# Patient Record
Sex: MALE | Race: WHITE | NOT HISPANIC OR LATINO | Employment: OTHER | URBAN - METROPOLITAN AREA
[De-identification: names, ages, dates, MRNs, and addresses within clinical notes are randomized per-mention and may not be internally consistent; named-entity substitution may affect disease eponyms.]

---

## 2017-04-04 ENCOUNTER — AMBULATORY - HEALTHEAST (OUTPATIENT)
Dept: CARDIOLOGY | Facility: CLINIC | Age: 67
End: 2017-04-04

## 2017-04-05 ENCOUNTER — OFFICE VISIT - HEALTHEAST (OUTPATIENT)
Dept: CARDIOLOGY | Facility: CLINIC | Age: 67
End: 2017-04-05

## 2017-04-05 DIAGNOSIS — I10 ESSENTIAL HYPERTENSION: ICD-10-CM

## 2017-04-05 DIAGNOSIS — G47.30 SLEEP APNEA, UNSPECIFIED TYPE: ICD-10-CM

## 2017-04-05 DIAGNOSIS — I48.0 PAROXYSMAL ATRIAL FIBRILLATION (H): ICD-10-CM

## 2017-04-05 ASSESSMENT — MIFFLIN-ST. JEOR: SCORE: 1718.34

## 2017-10-10 ENCOUNTER — RECORDS - HEALTHEAST (OUTPATIENT)
Dept: LAB | Facility: CLINIC | Age: 67
End: 2017-10-10

## 2017-10-10 LAB
CHOLEST SERPL-MCNC: 176 MG/DL
FASTING STATUS PATIENT QL REPORTED: ABNORMAL
HDLC SERPL-MCNC: 30 MG/DL
LDLC SERPL CALC-MCNC: 112 MG/DL
TRIGL SERPL-MCNC: 168 MG/DL

## 2018-03-05 ENCOUNTER — COMMUNICATION - HEALTHEAST (OUTPATIENT)
Dept: CARDIOLOGY | Facility: CLINIC | Age: 68
End: 2018-03-05

## 2018-03-09 ENCOUNTER — OFFICE VISIT - HEALTHEAST (OUTPATIENT)
Dept: CARDIOLOGY | Facility: CLINIC | Age: 68
End: 2018-03-09

## 2018-03-09 DIAGNOSIS — I10 ESSENTIAL HYPERTENSION: ICD-10-CM

## 2018-03-09 DIAGNOSIS — I48.0 PAROXYSMAL ATRIAL FIBRILLATION WITH RAPID VENTRICULAR RESPONSE (H): ICD-10-CM

## 2018-03-09 DIAGNOSIS — G47.33 OBSTRUCTIVE SLEEP APNEA SYNDROME: ICD-10-CM

## 2018-03-09 ASSESSMENT — MIFFLIN-ST. JEOR: SCORE: 1718.34

## 2018-04-09 ENCOUNTER — COMMUNICATION - HEALTHEAST (OUTPATIENT)
Dept: CARDIOLOGY | Facility: CLINIC | Age: 68
End: 2018-04-09

## 2018-04-09 DIAGNOSIS — I10 ESSENTIAL HYPERTENSION: ICD-10-CM

## 2018-04-18 ENCOUNTER — AMBULATORY - HEALTHEAST (OUTPATIENT)
Dept: CARDIOLOGY | Facility: CLINIC | Age: 68
End: 2018-04-18

## 2018-04-18 ENCOUNTER — RECORDS - HEALTHEAST (OUTPATIENT)
Dept: ADMINISTRATIVE | Facility: OTHER | Age: 68
End: 2018-04-18

## 2018-04-20 ENCOUNTER — OFFICE VISIT - HEALTHEAST (OUTPATIENT)
Dept: CARDIOLOGY | Facility: CLINIC | Age: 68
End: 2018-04-20

## 2018-04-20 DIAGNOSIS — I10 ESSENTIAL HYPERTENSION: ICD-10-CM

## 2018-04-20 DIAGNOSIS — G47.33 OBSTRUCTIVE SLEEP APNEA SYNDROME: ICD-10-CM

## 2018-04-20 DIAGNOSIS — I48.0 PAROXYSMAL ATRIAL FIBRILLATION WITH RAPID VENTRICULAR RESPONSE (H): ICD-10-CM

## 2018-04-20 ASSESSMENT — MIFFLIN-ST. JEOR: SCORE: 1722.88

## 2018-09-20 ENCOUNTER — AMBULATORY - HEALTHEAST (OUTPATIENT)
Dept: CARDIOLOGY | Facility: CLINIC | Age: 68
End: 2018-09-20

## 2018-09-20 ENCOUNTER — OFFICE VISIT - HEALTHEAST (OUTPATIENT)
Dept: CARDIOLOGY | Facility: CLINIC | Age: 68
End: 2018-09-20

## 2018-09-20 DIAGNOSIS — G47.33 OBSTRUCTIVE SLEEP APNEA SYNDROME: ICD-10-CM

## 2018-09-20 DIAGNOSIS — I10 ESSENTIAL HYPERTENSION: ICD-10-CM

## 2018-09-20 DIAGNOSIS — I48.0 PAROXYSMAL ATRIAL FIBRILLATION WITH RAPID VENTRICULAR RESPONSE (H): ICD-10-CM

## 2018-09-20 ASSESSMENT — MIFFLIN-ST. JEOR: SCORE: 1722.88

## 2018-09-21 LAB
ATRIAL RATE - MUSE: 49 BPM
DIASTOLIC BLOOD PRESSURE - MUSE: NORMAL MMHG
INTERPRETATION ECG - MUSE: NORMAL
P AXIS - MUSE: 43 DEGREES
PR INTERVAL - MUSE: 162 MS
QRS DURATION - MUSE: 92 MS
QT - MUSE: 456 MS
QTC - MUSE: 411 MS
R AXIS - MUSE: 0 DEGREES
SYSTOLIC BLOOD PRESSURE - MUSE: NORMAL MMHG
T AXIS - MUSE: 28 DEGREES
VENTRICULAR RATE- MUSE: 49 BPM

## 2018-10-25 ENCOUNTER — RECORDS - HEALTHEAST (OUTPATIENT)
Dept: ADMINISTRATIVE | Facility: OTHER | Age: 68
End: 2018-10-25

## 2018-10-31 ENCOUNTER — OFFICE VISIT - HEALTHEAST (OUTPATIENT)
Dept: CARDIOLOGY | Facility: CLINIC | Age: 68
End: 2018-10-31

## 2018-10-31 DIAGNOSIS — I10 ESSENTIAL HYPERTENSION: ICD-10-CM

## 2018-10-31 DIAGNOSIS — I48.0 PAROXYSMAL ATRIAL FIBRILLATION WITH RAPID VENTRICULAR RESPONSE (H): ICD-10-CM

## 2018-10-31 DIAGNOSIS — G47.33 OBSTRUCTIVE SLEEP APNEA SYNDROME: ICD-10-CM

## 2018-10-31 ASSESSMENT — MIFFLIN-ST. JEOR: SCORE: 1727.41

## 2018-11-07 ENCOUNTER — COMMUNICATION - HEALTHEAST (OUTPATIENT)
Dept: CARDIOLOGY | Facility: CLINIC | Age: 68
End: 2018-11-07

## 2018-11-07 ENCOUNTER — AMBULATORY - HEALTHEAST (OUTPATIENT)
Dept: CARDIOLOGY | Facility: CLINIC | Age: 68
End: 2018-11-07

## 2018-11-07 ENCOUNTER — SURGERY - HEALTHEAST (OUTPATIENT)
Dept: CARDIOLOGY | Facility: CLINIC | Age: 68
End: 2018-11-07

## 2018-11-07 DIAGNOSIS — I48.0 PAROXYSMAL ATRIAL FIBRILLATION (H): ICD-10-CM

## 2018-12-11 ENCOUNTER — OFFICE VISIT - HEALTHEAST (OUTPATIENT)
Dept: CARDIOLOGY | Facility: CLINIC | Age: 68
End: 2018-12-11

## 2018-12-11 DIAGNOSIS — I10 ESSENTIAL HYPERTENSION: ICD-10-CM

## 2018-12-11 DIAGNOSIS — G47.33 OBSTRUCTIVE SLEEP APNEA SYNDROME: ICD-10-CM

## 2018-12-11 DIAGNOSIS — I48.0 PAROXYSMAL ATRIAL FIBRILLATION WITH RAPID VENTRICULAR RESPONSE (H): ICD-10-CM

## 2018-12-11 ASSESSMENT — MIFFLIN-ST. JEOR: SCORE: 1709.27

## 2018-12-12 ENCOUNTER — COMMUNICATION - HEALTHEAST (OUTPATIENT)
Dept: CARDIOLOGY | Facility: CLINIC | Age: 68
End: 2018-12-12

## 2019-01-02 ENCOUNTER — COMMUNICATION - HEALTHEAST (OUTPATIENT)
Dept: CARDIOLOGY | Facility: CLINIC | Age: 69
End: 2019-01-02

## 2019-01-02 DIAGNOSIS — I48.0 PAROXYSMAL ATRIAL FIBRILLATION (H): ICD-10-CM

## 2019-01-04 ENCOUNTER — COMMUNICATION - HEALTHEAST (OUTPATIENT)
Dept: CARDIOLOGY | Facility: CLINIC | Age: 69
End: 2019-01-04

## 2019-02-14 ENCOUNTER — RECORDS - HEALTHEAST (OUTPATIENT)
Dept: LAB | Facility: CLINIC | Age: 69
End: 2019-02-14

## 2019-02-15 LAB
ALBUMIN SERPL-MCNC: 4.1 G/DL (ref 3.5–5)
ALP SERPL-CCNC: 79 U/L (ref 45–120)
ALT SERPL W P-5'-P-CCNC: 21 U/L (ref 0–45)
ANION GAP SERPL CALCULATED.3IONS-SCNC: 8 MMOL/L (ref 5–18)
AST SERPL W P-5'-P-CCNC: 20 U/L (ref 0–40)
BILIRUB SERPL-MCNC: 0.8 MG/DL (ref 0–1)
BUN SERPL-MCNC: 15 MG/DL (ref 8–22)
CALCIUM SERPL-MCNC: 10 MG/DL (ref 8.5–10.5)
CHLORIDE BLD-SCNC: 107 MMOL/L (ref 98–107)
CHOLEST SERPL-MCNC: 184 MG/DL
CO2 SERPL-SCNC: 29 MMOL/L (ref 22–31)
CREAT SERPL-MCNC: 1.12 MG/DL (ref 0.7–1.3)
FASTING STATUS PATIENT QL REPORTED: ABNORMAL
GFR SERPL CREATININE-BSD FRML MDRD: >60 ML/MIN/1.73M2
GLUCOSE BLD-MCNC: 91 MG/DL (ref 70–125)
HDLC SERPL-MCNC: 34 MG/DL
LDLC SERPL CALC-MCNC: 130 MG/DL
POTASSIUM BLD-SCNC: 4.5 MMOL/L (ref 3.5–5)
PROT SERPL-MCNC: 7.2 G/DL (ref 6–8)
SODIUM SERPL-SCNC: 144 MMOL/L (ref 136–145)
TRIGL SERPL-MCNC: 101 MG/DL
TSH SERPL DL<=0.005 MIU/L-ACNC: 1.47 UIU/ML (ref 0.3–5)

## 2019-02-18 LAB — 25(OH)D3 SERPL-MCNC: 37.1 NG/ML (ref 30–80)

## 2019-02-25 ENCOUNTER — HOSPITAL ENCOUNTER (OUTPATIENT)
Dept: CARDIOLOGY | Facility: CLINIC | Age: 69
Discharge: HOME | End: 2019-02-25
Attending: INTERNAL MEDICINE

## 2019-02-25 ENCOUNTER — COMMUNICATION - HEALTHEAST (OUTPATIENT)
Dept: CARDIOLOGY | Facility: CLINIC | Age: 69
End: 2019-02-25

## 2019-02-25 DIAGNOSIS — I48.0 PAROXYSMAL ATRIAL FIBRILLATION (H): ICD-10-CM

## 2019-02-25 LAB
AORTIC ROOT: 2.7 CM
AORTIC VALVE MEAN VELOCITY: 94.9 CM/S
AV DIMENSIONLESS INDEX VTI: 0.9
AV MEAN GRADIENT: 4 MMHG
AV PEAK GRADIENT: 6.9 MMHG
AV VALVE AREA: 3.6 CM2
AV VELOCITY RATIO: 0.9
BSA FOR ECHO PROCEDURE: 2.17 M2
CV BLOOD PRESSURE: ABNORMAL MMHG
CV ECHO HEIGHT: 68 IN
CV ECHO WEIGHT: 216 LBS
DOP CALC AO PEAK VEL: 131 CM/S
DOP CALC AO VTI: 28.1 CM
DOP CALC LVOT AREA: 4.15 CM2
DOP CALC LVOT DIAMETER: 2.3 CM
DOP CALC LVOT PEAK VEL: 124 CM/S
DOP CALC LVOT STROKE VOLUME: 100.1 CM3
DOP CALCLVOT PEAK VEL VTI: 24.1 CM
EJECTION FRACTION: 80 % (ref 55–75)
FRACTIONAL SHORTENING: 41.5 % (ref 28–44)
INTERVENTRICULAR SEPTUM IN END DIASTOLE: 1 CM (ref 0.6–1)
IVS/PW RATIO: 0.9
LA AREA 1: 24.7 CM2
LA AREA 2: 25.1 CM2
LEFT ATRIUM AREA: 25.1 CM2
LEFT ATRIUM LENGTH: 5.2 CM
LEFT ATRIUM SIZE: 3.5 CM
LEFT ATRIUM VOLUME INDEX: 46.7 ML/M2
LEFT ATRIUM VOLUME: 101.3 ML
LEFT VENTRICLE CARDIAC INDEX: 2.6 L/MIN/M2
LEFT VENTRICLE CARDIAC OUTPUT: 5.6 L/MIN
LEFT VENTRICLE DIASTOLIC VOLUME INDEX: 50.2 CM3/M2 (ref 34–74)
LEFT VENTRICLE DIASTOLIC VOLUME: 109 CM3 (ref 62–150)
LEFT VENTRICLE HEART RATE: 56 BPM
LEFT VENTRICLE MASS INDEX: 98.6 G/M2
LEFT VENTRICLE SYSTOLIC VOLUME INDEX: 10.1 CM3/M2 (ref 11–31)
LEFT VENTRICLE SYSTOLIC VOLUME: 22 CM3 (ref 21–61)
LEFT VENTRICULAR INTERNAL DIMENSION IN DIASTOLE: 5.3 CM (ref 4.2–5.8)
LEFT VENTRICULAR INTERNAL DIMENSION IN SYSTOLE: 3.1 CM (ref 2.5–4)
LEFT VENTRICULAR MASS: 213.9 G
LEFT VENTRICULAR OUTFLOW TRACT MEAN GRADIENT: 3 MMHG
LEFT VENTRICULAR OUTFLOW TRACT MEAN VELOCITY: 79.7 CM/S
LEFT VENTRICULAR OUTFLOW TRACT PEAK GRADIENT: 6 MMHG
LEFT VENTRICULAR POSTERIOR WALL IN END DIASTOLE: 1.1 CM (ref 0.6–1)
LV STROKE VOLUME INDEX: 46.1 ML/M2
MITRAL VALVE E/A RATIO: 1.1
MV AVERAGE E/E' RATIO: 11.9 CM/S
MV DECELERATION TIME: 215 MSEC
MV E'TISSUE VEL-LAT: 7.31 CM/S
MV E'TISSUE VEL-MED: 8.09 CM/S
MV LATERAL E/E' RATIO: 12.5
MV MEDIAL E/E' RATIO: 11.3
MV PEAK A VELOCITY: 81.9 CM/S
MV PEAK E VELOCITY: 91.4 CM/S
NUC REST DIASTOLIC VOLUME INDEX: 3456 LBS
NUC REST SYSTOLIC VOLUME INDEX: 68 IN
PR MAX PG: 3 MMHG
PR PEAK VELOCITY: 90.9 CM/S
TRICUSPID REGURGITATION PEAK PRESSURE GRADIENT: 28.1 MMHG
TRICUSPID VALVE ANULAR PLANE SYSTOLIC EXCURSION: 2.9 CM
TRICUSPID VALVE PEAK REGURGITANT VELOCITY: 265 CM/S

## 2019-02-25 ASSESSMENT — MIFFLIN-ST. JEOR: SCORE: 1709.27

## 2019-03-04 ENCOUNTER — HOSPITAL ENCOUNTER (OUTPATIENT)
Dept: CT IMAGING | Facility: CLINIC | Age: 69
Discharge: HOME | End: 2019-03-04
Attending: INTERNAL MEDICINE

## 2019-03-04 DIAGNOSIS — I48.0 PAROXYSMAL ATRIAL FIBRILLATION (H): ICD-10-CM

## 2019-03-04 LAB — BSA FOR ECHO PROCEDURE: 2.17 M2

## 2019-03-04 ASSESSMENT — MIFFLIN-ST. JEOR: SCORE: 1709.27

## 2019-03-06 ENCOUNTER — AMBULATORY - HEALTHEAST (OUTPATIENT)
Dept: CARDIOLOGY | Facility: CLINIC | Age: 69
End: 2019-03-06

## 2019-03-06 ENCOUNTER — RECORDS - HEALTHEAST (OUTPATIENT)
Dept: ADMINISTRATIVE | Facility: OTHER | Age: 69
End: 2019-03-06

## 2019-03-13 ENCOUNTER — SURGERY - HEALTHEAST (OUTPATIENT)
Dept: CARDIOLOGY | Facility: CLINIC | Age: 69
End: 2019-03-13

## 2019-03-13 ENCOUNTER — AMBULATORY - HEALTHEAST (OUTPATIENT)
Dept: CARDIOLOGY | Facility: CLINIC | Age: 69
End: 2019-03-13

## 2019-03-13 ENCOUNTER — OFFICE VISIT - HEALTHEAST (OUTPATIENT)
Dept: CARDIOLOGY | Facility: CLINIC | Age: 69
End: 2019-03-13

## 2019-03-13 DIAGNOSIS — I48.0 PAROXYSMAL ATRIAL FIBRILLATION (H): ICD-10-CM

## 2019-03-13 DIAGNOSIS — G47.33 OBSTRUCTIVE SLEEP APNEA SYNDROME: ICD-10-CM

## 2019-03-13 LAB
ANION GAP SERPL CALCULATED.3IONS-SCNC: 8 MMOL/L (ref 5–18)
BUN SERPL-MCNC: 15 MG/DL (ref 8–22)
CALCIUM SERPL-MCNC: 9.6 MG/DL (ref 8.5–10.5)
CHLORIDE BLD-SCNC: 107 MMOL/L (ref 98–107)
CO2 SERPL-SCNC: 27 MMOL/L (ref 22–31)
CREAT SERPL-MCNC: 1.17 MG/DL (ref 0.7–1.3)
ERYTHROCYTE [DISTWIDTH] IN BLOOD BY AUTOMATED COUNT: 13.6 % (ref 11–14.5)
GFR SERPL CREATININE-BSD FRML MDRD: >60 ML/MIN/1.73M2
GLUCOSE BLD-MCNC: 132 MG/DL (ref 70–125)
HCT VFR BLD AUTO: 42.5 % (ref 40–54)
HGB BLD-MCNC: 14.4 G/DL (ref 14–18)
MCH RBC QN AUTO: 30.2 PG (ref 27–34)
MCHC RBC AUTO-ENTMCNC: 33.9 G/DL (ref 32–36)
MCV RBC AUTO: 89 FL (ref 80–100)
PLATELET # BLD AUTO: 243 THOU/UL (ref 140–440)
PMV BLD AUTO: 11 FL (ref 8.5–12.5)
POTASSIUM BLD-SCNC: 3.8 MMOL/L (ref 3.5–5)
RBC # BLD AUTO: 4.77 MILL/UL (ref 4.4–6.2)
SODIUM SERPL-SCNC: 142 MMOL/L (ref 136–145)
WBC: 7.7 THOU/UL (ref 4–11)

## 2019-03-13 ASSESSMENT — MIFFLIN-ST. JEOR: SCORE: 1709.27

## 2019-03-14 ENCOUNTER — COMMUNICATION - HEALTHEAST (OUTPATIENT)
Dept: CARDIOLOGY | Facility: CLINIC | Age: 69
End: 2019-03-14

## 2019-03-14 LAB
ATRIAL RATE - MUSE: 59 BPM
DIASTOLIC BLOOD PRESSURE - MUSE: NORMAL MMHG
INTERPRETATION ECG - MUSE: NORMAL
P AXIS - MUSE: 63 DEGREES
PR INTERVAL - MUSE: 162 MS
QRS DURATION - MUSE: 94 MS
QT - MUSE: 414 MS
QTC - MUSE: 409 MS
R AXIS - MUSE: -2 DEGREES
SYSTOLIC BLOOD PRESSURE - MUSE: NORMAL MMHG
T AXIS - MUSE: 35 DEGREES
VENTRICULAR RATE- MUSE: 59 BPM

## 2019-03-15 ENCOUNTER — ANESTHESIA - HEALTHEAST (OUTPATIENT)
Dept: CARDIOLOGY | Facility: CLINIC | Age: 69
End: 2019-03-15

## 2019-03-18 ENCOUNTER — RECORDS - HEALTHEAST (OUTPATIENT)
Dept: ADMINISTRATIVE | Facility: OTHER | Age: 69
End: 2019-03-18

## 2019-03-19 ENCOUNTER — SURGERY - HEALTHEAST (OUTPATIENT)
Dept: CARDIOLOGY | Facility: CLINIC | Age: 69
End: 2019-03-19

## 2019-03-19 ENCOUNTER — COMMUNICATION - HEALTHEAST (OUTPATIENT)
Dept: CARDIOLOGY | Facility: CLINIC | Age: 69
End: 2019-03-19

## 2019-03-19 DIAGNOSIS — I48.19 PERSISTENT ATRIAL FIBRILLATION (H): ICD-10-CM

## 2019-03-20 ENCOUNTER — COMMUNICATION - HEALTHEAST (OUTPATIENT)
Dept: CARDIOLOGY | Facility: CLINIC | Age: 69
End: 2019-03-20

## 2019-03-26 ENCOUNTER — ANESTHESIA - HEALTHEAST (OUTPATIENT)
Dept: CARDIOLOGY | Facility: CLINIC | Age: 69
End: 2019-03-26

## 2019-03-26 ASSESSMENT — MIFFLIN-ST. JEOR: SCORE: 1682.05

## 2019-03-27 ENCOUNTER — SURGERY - HEALTHEAST (OUTPATIENT)
Dept: CARDIOLOGY | Facility: CLINIC | Age: 69
End: 2019-03-27

## 2019-03-27 ASSESSMENT — MIFFLIN-ST. JEOR
SCORE: 1696.57
SCORE: 1682.05

## 2019-03-28 ASSESSMENT — MIFFLIN-ST. JEOR: SCORE: 1696.51

## 2019-04-01 ENCOUNTER — AMBULATORY - HEALTHEAST (OUTPATIENT)
Dept: CARDIOLOGY | Facility: CLINIC | Age: 69
End: 2019-04-01

## 2019-04-01 DIAGNOSIS — I48.0 PAROXYSMAL ATRIAL FIBRILLATION (H): ICD-10-CM

## 2019-04-01 ASSESSMENT — MIFFLIN-ST. JEOR: SCORE: 1705.19

## 2019-04-03 ENCOUNTER — DOCUMENTATION ONLY (OUTPATIENT)
Dept: OTHER | Facility: CLINIC | Age: 69
End: 2019-04-03

## 2019-05-03 ENCOUNTER — RECORDS - HEALTHEAST (OUTPATIENT)
Dept: ADMINISTRATIVE | Facility: OTHER | Age: 69
End: 2019-05-03

## 2019-05-03 ENCOUNTER — AMBULATORY - HEALTHEAST (OUTPATIENT)
Dept: CARDIOLOGY | Facility: CLINIC | Age: 69
End: 2019-05-03

## 2019-05-08 ENCOUNTER — OFFICE VISIT - HEALTHEAST (OUTPATIENT)
Dept: CARDIOLOGY | Facility: CLINIC | Age: 69
End: 2019-05-08

## 2019-05-08 DIAGNOSIS — G47.33 OBSTRUCTIVE SLEEP APNEA SYNDROME: ICD-10-CM

## 2019-05-08 DIAGNOSIS — I48.0 PAROXYSMAL ATRIAL FIBRILLATION (H): ICD-10-CM

## 2019-05-08 DIAGNOSIS — I10 ESSENTIAL HYPERTENSION: ICD-10-CM

## 2019-05-08 DIAGNOSIS — Z98.890 STATUS POST CATHETER ABLATION OF ATRIAL FIBRILLATION: ICD-10-CM

## 2019-05-08 ASSESSMENT — MIFFLIN-ST. JEOR: SCORE: 1713.81

## 2019-05-15 ENCOUNTER — AMBULATORY - HEALTHEAST (OUTPATIENT)
Dept: CARDIOLOGY | Facility: CLINIC | Age: 69
End: 2019-05-15

## 2019-05-15 ENCOUNTER — HOSPITAL ENCOUNTER (OUTPATIENT)
Dept: CARDIOLOGY | Facility: CLINIC | Age: 69
Discharge: HOME | End: 2019-05-15
Attending: NURSE PRACTITIONER

## 2019-05-15 DIAGNOSIS — Z98.890 STATUS POST CATHETER ABLATION OF ATRIAL FIBRILLATION: ICD-10-CM

## 2019-05-15 DIAGNOSIS — I48.0 PAROXYSMAL ATRIAL FIBRILLATION (H): ICD-10-CM

## 2019-05-15 DIAGNOSIS — I10 ESSENTIAL HYPERTENSION: ICD-10-CM

## 2019-05-15 LAB
ANION GAP SERPL CALCULATED.3IONS-SCNC: 7 MMOL/L (ref 5–18)
BUN SERPL-MCNC: 16 MG/DL (ref 8–22)
CALCIUM SERPL-MCNC: 10.4 MG/DL (ref 8.5–10.5)
CHLORIDE BLD-SCNC: 107 MMOL/L (ref 98–107)
CO2 SERPL-SCNC: 28 MMOL/L (ref 22–31)
CREAT SERPL-MCNC: 1.22 MG/DL (ref 0.7–1.3)
GFR SERPL CREATININE-BSD FRML MDRD: 59 ML/MIN/1.73M2
GLUCOSE BLD-MCNC: 116 MG/DL (ref 70–125)
POTASSIUM BLD-SCNC: 4.2 MMOL/L (ref 3.5–5)
SODIUM SERPL-SCNC: 142 MMOL/L (ref 136–145)

## 2019-07-01 ENCOUNTER — OFFICE VISIT - HEALTHEAST (OUTPATIENT)
Dept: CARDIOLOGY | Facility: CLINIC | Age: 69
End: 2019-07-01

## 2019-07-01 DIAGNOSIS — I10 ESSENTIAL HYPERTENSION: ICD-10-CM

## 2019-07-01 DIAGNOSIS — G47.33 OBSTRUCTIVE SLEEP APNEA SYNDROME: ICD-10-CM

## 2019-07-01 DIAGNOSIS — I48.0 PAROXYSMAL ATRIAL FIBRILLATION (H): ICD-10-CM

## 2019-07-01 DIAGNOSIS — Z98.890 STATUS POST CATHETER ABLATION OF ATRIAL FIBRILLATION: ICD-10-CM

## 2019-07-01 ASSESSMENT — MIFFLIN-ST. JEOR: SCORE: 1706.55

## 2019-12-07 ENCOUNTER — COMMUNICATION - HEALTHEAST (OUTPATIENT)
Dept: CARDIOLOGY | Facility: CLINIC | Age: 69
End: 2019-12-07

## 2019-12-07 DIAGNOSIS — I48.0 PAROXYSMAL ATRIAL FIBRILLATION WITH RAPID VENTRICULAR RESPONSE (H): ICD-10-CM

## 2020-04-14 ENCOUNTER — RECORDS - HEALTHEAST (OUTPATIENT)
Dept: LAB | Facility: CLINIC | Age: 70
End: 2020-04-14

## 2020-04-14 LAB
ANION GAP SERPL CALCULATED.3IONS-SCNC: 12 MMOL/L (ref 5–18)
BUN SERPL-MCNC: 13 MG/DL (ref 8–28)
CALCIUM SERPL-MCNC: 10.1 MG/DL (ref 8.5–10.5)
CHLORIDE BLD-SCNC: 104 MMOL/L (ref 98–107)
CHOLEST SERPL-MCNC: 226 MG/DL
CO2 SERPL-SCNC: 27 MMOL/L (ref 22–31)
CREAT SERPL-MCNC: 1.09 MG/DL (ref 0.7–1.3)
FASTING STATUS PATIENT QL REPORTED: ABNORMAL
GFR SERPL CREATININE-BSD FRML MDRD: >60 ML/MIN/1.73M2
GLUCOSE BLD-MCNC: 87 MG/DL (ref 70–125)
HDLC SERPL-MCNC: 35 MG/DL
LDLC SERPL CALC-MCNC: 164 MG/DL
POTASSIUM BLD-SCNC: 4 MMOL/L (ref 3.5–5)
SODIUM SERPL-SCNC: 143 MMOL/L (ref 136–145)
TRIGL SERPL-MCNC: 133 MG/DL

## 2020-04-20 ENCOUNTER — COMMUNICATION - HEALTHEAST (OUTPATIENT)
Dept: CARDIOLOGY | Facility: CLINIC | Age: 70
End: 2020-04-20

## 2020-04-20 DIAGNOSIS — I10 ESSENTIAL HYPERTENSION: ICD-10-CM

## 2020-06-06 ENCOUNTER — COMMUNICATION - HEALTHEAST (OUTPATIENT)
Dept: CARDIOLOGY | Facility: CLINIC | Age: 70
End: 2020-06-06

## 2020-06-06 DIAGNOSIS — I48.0 PAROXYSMAL ATRIAL FIBRILLATION WITH RAPID VENTRICULAR RESPONSE (H): ICD-10-CM

## 2020-06-11 ENCOUNTER — RECORDS - HEALTHEAST (OUTPATIENT)
Dept: ADMINISTRATIVE | Facility: OTHER | Age: 70
End: 2020-06-11

## 2020-06-11 ENCOUNTER — AMBULATORY - HEALTHEAST (OUTPATIENT)
Dept: CARDIOLOGY | Facility: CLINIC | Age: 70
End: 2020-06-11

## 2020-06-16 ENCOUNTER — OFFICE VISIT - HEALTHEAST (OUTPATIENT)
Dept: CARDIOLOGY | Facility: CLINIC | Age: 70
End: 2020-06-16

## 2020-06-16 DIAGNOSIS — Z98.890 STATUS POST CATHETER ABLATION OF ATRIAL FIBRILLATION: ICD-10-CM

## 2020-06-16 DIAGNOSIS — G47.33 OBSTRUCTIVE SLEEP APNEA SYNDROME: ICD-10-CM

## 2020-06-16 DIAGNOSIS — E78.5 HYPERLIPIDEMIA LDL GOAL <100: ICD-10-CM

## 2020-06-16 DIAGNOSIS — I48.0 PAROXYSMAL ATRIAL FIBRILLATION (H): ICD-10-CM

## 2020-06-16 DIAGNOSIS — I10 ESSENTIAL HYPERTENSION: ICD-10-CM

## 2020-06-16 RX ORDER — ATORVASTATIN CALCIUM 40 MG/1
80 TABLET, FILM COATED ORAL DAILY
Status: SHIPPED | COMMUNITY
Start: 2020-05-14 | End: 2023-05-09

## 2020-06-16 RX ORDER — AMLODIPINE BESYLATE 10 MG/1
10 TABLET ORAL DAILY
Status: SHIPPED | COMMUNITY
Start: 2020-05-14

## 2020-06-19 ENCOUNTER — HOSPITAL ENCOUNTER (OUTPATIENT)
Dept: CARDIOLOGY | Facility: HOSPITAL | Age: 70
Discharge: HOME | End: 2020-06-19
Attending: INTERNAL MEDICINE

## 2020-06-19 DIAGNOSIS — I48.0 PAROXYSMAL ATRIAL FIBRILLATION (H): ICD-10-CM

## 2020-06-25 ENCOUNTER — RECORDS - HEALTHEAST (OUTPATIENT)
Dept: LAB | Facility: CLINIC | Age: 70
End: 2020-06-25

## 2020-06-25 LAB
CHOLEST SERPL-MCNC: 156 MG/DL
FASTING STATUS PATIENT QL REPORTED: YES
HDLC SERPL-MCNC: 28 MG/DL
LDLC SERPL CALC-MCNC: 106 MG/DL
TRIGL SERPL-MCNC: 109 MG/DL

## 2020-07-29 ENCOUNTER — COMMUNICATION - HEALTHEAST (OUTPATIENT)
Dept: CARDIOLOGY | Facility: CLINIC | Age: 70
End: 2020-07-29

## 2020-08-10 ENCOUNTER — RECORDS - HEALTHEAST (OUTPATIENT)
Dept: LAB | Facility: CLINIC | Age: 70
End: 2020-08-10

## 2020-08-10 LAB
CHOLEST SERPL-MCNC: 132 MG/DL
FASTING STATUS PATIENT QL REPORTED: NO
HDLC SERPL-MCNC: 23 MG/DL
LDLC SERPL CALC-MCNC: 47 MG/DL
TRIGL SERPL-MCNC: 310 MG/DL

## 2020-11-30 ENCOUNTER — COMMUNICATION - HEALTHEAST (OUTPATIENT)
Dept: CARDIOLOGY | Facility: CLINIC | Age: 70
End: 2020-11-30

## 2020-11-30 DIAGNOSIS — I48.0 PAROXYSMAL ATRIAL FIBRILLATION WITH RAPID VENTRICULAR RESPONSE (H): ICD-10-CM

## 2020-12-07 ENCOUNTER — RECORDS - HEALTHEAST (OUTPATIENT)
Dept: LAB | Facility: CLINIC | Age: 70
End: 2020-12-07

## 2020-12-07 LAB
ANION GAP SERPL CALCULATED.3IONS-SCNC: 8 MMOL/L (ref 5–18)
BUN SERPL-MCNC: 17 MG/DL (ref 8–28)
CALCIUM SERPL-MCNC: 9.9 MG/DL (ref 8.5–10.5)
CHLORIDE BLD-SCNC: 106 MMOL/L (ref 98–107)
CO2 SERPL-SCNC: 28 MMOL/L (ref 22–31)
CREAT SERPL-MCNC: 1.24 MG/DL (ref 0.7–1.3)
ERYTHROCYTE [DISTWIDTH] IN BLOOD BY AUTOMATED COUNT: 13.4 % (ref 11–14.5)
GFR SERPL CREATININE-BSD FRML MDRD: 57 ML/MIN/1.73M2
GLUCOSE BLD-MCNC: 113 MG/DL (ref 70–125)
HCT VFR BLD AUTO: 43.8 % (ref 40–54)
HGB BLD-MCNC: 15.4 G/DL (ref 14–18)
MCH RBC QN AUTO: 31.6 PG (ref 27–34)
MCHC RBC AUTO-ENTMCNC: 35.2 G/DL (ref 32–36)
MCV RBC AUTO: 90 FL (ref 80–100)
PLATELET # BLD AUTO: 253 THOU/UL (ref 140–440)
PMV BLD AUTO: 11.5 FL (ref 8.5–12.5)
POTASSIUM BLD-SCNC: 4.2 MMOL/L (ref 3.5–5)
RBC # BLD AUTO: 4.88 MILL/UL (ref 4.4–6.2)
SODIUM SERPL-SCNC: 142 MMOL/L (ref 136–145)
WBC: 8.4 THOU/UL (ref 4–11)

## 2021-01-11 ENCOUNTER — COMMUNICATION - HEALTHEAST (OUTPATIENT)
Dept: CARDIOLOGY | Facility: CLINIC | Age: 71
End: 2021-01-11

## 2021-01-15 ENCOUNTER — RECORDS - HEALTHEAST (OUTPATIENT)
Dept: LAB | Facility: CLINIC | Age: 71
End: 2021-01-15

## 2021-01-15 ENCOUNTER — COMMUNICATION - HEALTHEAST (OUTPATIENT)
Dept: CARDIOLOGY | Facility: CLINIC | Age: 71
End: 2021-01-15

## 2021-01-15 ENCOUNTER — AMBULATORY - HEALTHEAST (OUTPATIENT)
Dept: CARDIOLOGY | Facility: CLINIC | Age: 71
End: 2021-01-15

## 2021-01-15 ENCOUNTER — TRANSFERRED RECORDS (OUTPATIENT)
Dept: HEALTH INFORMATION MANAGEMENT | Facility: CLINIC | Age: 71
End: 2021-01-15

## 2021-01-15 ENCOUNTER — RECORDS - HEALTHEAST (OUTPATIENT)
Dept: ADMINISTRATIVE | Facility: OTHER | Age: 71
End: 2021-01-15

## 2021-01-15 LAB
CHOLEST SERPL-MCNC: 153 MG/DL
FASTING STATUS PATIENT QL REPORTED: YES
HDLC SERPL-MCNC: 31 MG/DL
LDLC SERPL CALC-MCNC: 105 MG/DL
PSA SERPL-MCNC: <0.1 NG/ML (ref 0–6.5)
TRIGL SERPL-MCNC: 87 MG/DL

## 2021-01-18 ENCOUNTER — OFFICE VISIT - HEALTHEAST (OUTPATIENT)
Dept: CARDIOLOGY | Facility: CLINIC | Age: 71
End: 2021-01-18

## 2021-01-18 DIAGNOSIS — I10 ESSENTIAL HYPERTENSION: ICD-10-CM

## 2021-01-18 DIAGNOSIS — I48.0 PAROXYSMAL ATRIAL FIBRILLATION (H): ICD-10-CM

## 2021-01-18 DIAGNOSIS — Z98.890 STATUS POST CATHETER ABLATION OF ATRIAL FIBRILLATION: ICD-10-CM

## 2021-01-18 DIAGNOSIS — G47.33 OBSTRUCTIVE SLEEP APNEA SYNDROME: ICD-10-CM

## 2021-04-11 ENCOUNTER — COMMUNICATION - HEALTHEAST (OUTPATIENT)
Dept: CARDIOLOGY | Facility: CLINIC | Age: 71
End: 2021-04-11

## 2021-04-11 DIAGNOSIS — I10 ESSENTIAL HYPERTENSION: ICD-10-CM

## 2021-04-12 RX ORDER — LISINOPRIL AND HYDROCHLOROTHIAZIDE 20; 25 MG/1; MG/1
TABLET ORAL
Qty: 90 TABLET | Refills: 2 | Status: SHIPPED | OUTPATIENT
Start: 2021-04-12 | End: 2022-01-04

## 2021-05-27 NOTE — PROGRESS NOTES
Post Procedural PVI Follow Up Visit    Pt is seen in clinic today status post PVI with Dr Lu on 3/27/19.     General Assessment:   Pts vital signs stable, weight compared to pre procedural weight and is stable, lung sounds clear, heart rate regular, heart sounds S1 and S2 present, palpable radial and pedal pulses and no edema/fluid retention noted.   Pt denies generalized or localized pain abnormal to healing s/p, difficulty swallowing, SOB, thoat pain, heart burn, chest pain, urinary retention/difficulty and s/s of infection.  Pt is tolerating advancement in activity well, staying well hydrated, has a good appetite, eating well balanced meals and gradually working into baseline activity.     Rhythm Assessment:   Pt denies palpitations, irregularities in HR or rhythm and symptoms or sustained AF episodes.    Procedure Site Assessment:   Pts right groin has 2 puncture sites, is C/D/I, no drainage, moderate amount of bruising noted around puncture sites, 1 suture in place, suture removed, groin is soft, and pt denies pain at the site. Left groin has 2 puncture sites, is C/D/I, no drainage, moderate amount of amount of bruising noted around puncture site, 1 suture in place, sutures removed, groin is soft, and pt denies pain at the site. No bruits we heard bilaterally, and pt has strong bilateral femoral and pedal pulses present. All puncture sites were left open to air.    Anticoagulation/Medication:  Pt was instructed to remain on Eliquis without interruption until seen for 3mo follow-up, for further instructions/managment.  Reviewed with pt importance of anticoagulation s/p PVI, reviewed with pt s/s of bleeding while on anticoagulation s/p PVI and encouraged to call with any questions or concerns about anticoagulants  Pt will continue ASA 81mg Daily for 1 mo s/p PVI    Education completed with pt at this visit:  Reviewed post-op PVI healing process, follow up office visit requirements, physical restrictions,  nutritional requirements, when to contact EP-RN/EP-MD, contact information was given to the pt for further concerns or questions and pt verbalized understanding    4/1/2019 10:58 AM  Sadaf Myles RN

## 2021-05-27 NOTE — ANESTHESIA CARE TRANSFER NOTE
Last vitals:   Vitals:    03/28/19 0801   BP: 124/65   Pulse: (!) 57   Resp: 18   Temp: 37  C (98.6  F)   SpO2: 98%     Patient's level of consciousness is drowsy  Spontaneous respirations: yes  Maintains airway independently: yes  Dentition unchanged: yes  Oropharynx: oropharynx clear of all foreign objects    QCDR Measures:  ASA# 20 - Surgical Safety Checklist: WHO surgical safety checklist completed prior to induction    PQRS# 430 - Adult PONV Prevention: 4558F - Pt received => 2 anti-emetic agents (different classes) preop & intraop  ASA# 8 - Peds PONV Prevention: NA - Not pediatric patient, not GA or 2 or more risk factors NOT present  PQRS# 424 - Linsey-op Temp Management: 4559F - At least one body temp DOCUMENTED => 35.5C or 95.9F within required timeframe  PQRS# 426 - PACU Transfer Protocol: - Transfer of care checklist used  ASA# 14 - Acute Post-op Pain: ASA14B - Patient did NOT experience pain >= 7 out of 10

## 2021-05-27 NOTE — ANESTHESIA POSTPROCEDURE EVALUATION
Patient: Conrad Carson  EP Ablation PVI - 545AM ADMIT, GEN ANES - WHOLE CASE, NO DEV, TANYA - NOTIFIED 3/19, 4HRS W/ICE, H&P - 3/13, TEACH - EP RNS, EP Ablation Atrial Flutter  Anesthesia type: general    Patient location: Telemetry/Step Down Unit  Last vitals:   Vitals:    03/27/19 1720   BP: 142/78   Pulse:    Resp:    Temp:    SpO2:      Post vital signs: stable  Level of consciousness: awake, alert and oriented  Post-anesthesia pain: pain controlled  Post-anesthesia nausea and vomiting: no  Pulmonary: unassisted, return to baseline  Cardiovascular: stable and blood pressure at baseline  Hydration: adequate  Anesthetic events: no    QCDR Measures:  ASA# 11 - Linsey-op Cardiac Arrest: ASA11B - Patient did NOT experience unanticipated cardiac arrest  ASA# 12 - Linsey-op Mortality Rate: ASA12B - Patient did NOT die  ASA# 13 - PACU Re-Intubation Rate: ASA13B - Patient did NOT require a new airway mgmt  ASA# 10 - Composite Anes Safety: ASA10A - No serious adverse event    Additional Notes:  Pt feels a little unsteady/off balance when ambulating.  Pt has assistance with ambulation.  RN at bedside.  VSS.  No other problems with anesthesia.

## 2021-05-27 NOTE — PATIENT INSTRUCTIONS - HE
Your anticoagulation medication Eliquis:    It is important to remain on your anticoagulation medication uninterrupted after your ablation to reduce your risk of a stroke or heart attack, do not stop this medication    Please remain on your aspirin for 1 month, then you can stop    Healing from your pulmonary vein ablation:    Stay well hydrated, and increase your fluid intake during this recovery period    High protein foods aide in your bodies healing process    No aggressive or aerobic activity for 7-10 days, and do not lift more than 10 pounds for 7 days     Increase your activity gradually over the next 5-10 days, working back to your normal daily activity    Please call me if any of the following occur:    Episodes of Atrial Fibrillation lasting greater than 4 hours, or if you notice the episodes are increasing in frequency or duration    If you develop shortness of breath, dizziness, or unresolving chest pains     Changes at your groin sites including swelling, hardening, drainage, increase in bruising, or an increase in pain    If you develop a temperature greater than 100.5 degrees (especially weeks 2-5 post     Procedure)      Call 911 if you are having symptoms of a stroke; difficulty with your speech, problems walking, difficulty with balance, vision disturbances, facial drooping or numbness, and muscle weakness on one side of your body     Your follow up appointments are as follows:    You will be seen by the electrophysiologist nurse practitioner in 6 weeks    You will have a 14 day ambulatory cardiac monitor in 3 months to assess your rhythm, this will be scheduled at your appointment with the electrophysiologist nurse practitioner    You will be seen by the electrophysiologist nurse practitioner again in 3 months    Sincerely,  Sadaf Myles RN [phone]    After hours please contact the on call service at # [phone]

## 2021-05-27 NOTE — ANESTHESIA PREPROCEDURE EVALUATION
Anesthesia Evaluation      Patient summary reviewed   No history of anesthetic complications     Airway   Mallampati: II  Neck ROM: full   Pulmonary - normal exam   (+) sleep apnea on CPAP, ,                          Cardiovascular   (+) hypertension, dysrhythmias, , hypercholesterolemia,     Rhythm: irregular        Neuro/Psych - negative ROS     Endo/Other    (+) obesity (BMI 32),      GI/Hepatic/Renal    (+) GERD well controlled,             Dental - normal exam                        Anesthesia Plan  Planned anesthetic: general endotracheal    ASA 3   Induction: intravenous   Anesthetic plan and risks discussed with: patient    Post-op plan: routine recovery

## 2021-05-28 NOTE — PATIENT INSTRUCTIONS - HE
"Conrad Carson,    It was a pleasure to see you today at the [facility].     My recommendations after this visit include:    Continue Eliquis 5 mg twice daily.    Increase lisinopril-hydrochlorothiazide 20-25 mg daily.  Labs in 1 week    Wear 2 week heart monitor to evaluate for \"silent\" A fib.  Push button to record symptoms if you have any.    Follow up in 6-8 weeks.    Anisha Adamson, [facility], Electrophysiology  [phone]   nurses [phone]          "

## 2021-05-30 VITALS — BODY MASS INDEX: 33.04 KG/M2 | HEIGHT: 68 IN | WEIGHT: 218 LBS

## 2021-05-31 ENCOUNTER — RECORDS - HEALTHEAST (OUTPATIENT)
Dept: ADMINISTRATIVE | Facility: CLINIC | Age: 71
End: 2021-05-31

## 2021-06-01 ENCOUNTER — RECORDS - HEALTHEAST (OUTPATIENT)
Dept: ADMINISTRATIVE | Facility: CLINIC | Age: 71
End: 2021-06-01

## 2021-06-01 VITALS — WEIGHT: 219 LBS | BODY MASS INDEX: 33.19 KG/M2 | HEIGHT: 68 IN

## 2021-06-01 VITALS — BODY MASS INDEX: 33.04 KG/M2 | WEIGHT: 218 LBS | HEIGHT: 68 IN

## 2021-06-02 VITALS — BODY MASS INDEX: 32.74 KG/M2 | WEIGHT: 216 LBS | HEIGHT: 68 IN

## 2021-06-02 VITALS — BODY MASS INDEX: 32.31 KG/M2 | WEIGHT: 213.19 LBS | HEIGHT: 68 IN

## 2021-06-02 VITALS — WEIGHT: 219 LBS | HEIGHT: 68 IN | BODY MASS INDEX: 33.19 KG/M2

## 2021-06-02 VITALS — HEIGHT: 68 IN | WEIGHT: 216 LBS | BODY MASS INDEX: 32.74 KG/M2

## 2021-06-02 VITALS — BODY MASS INDEX: 32.74 KG/M2 | HEIGHT: 68 IN | WEIGHT: 216 LBS

## 2021-06-02 VITALS — BODY MASS INDEX: 32.84 KG/M2 | BODY MASS INDEX: 32.84 KG/M2 | WEIGHT: 216 LBS | HEIGHT: 68 IN

## 2021-06-02 VITALS — BODY MASS INDEX: 33.34 KG/M2 | HEIGHT: 68 IN | WEIGHT: 220 LBS

## 2021-06-02 VITALS — WEIGHT: 215.1 LBS | BODY MASS INDEX: 32.6 KG/M2 | HEIGHT: 68 IN

## 2021-06-03 VITALS — HEIGHT: 68 IN | BODY MASS INDEX: 32.89 KG/M2 | WEIGHT: 217 LBS

## 2021-06-03 VITALS — HEIGHT: 68 IN | WEIGHT: 215.4 LBS | BODY MASS INDEX: 32.64 KG/M2

## 2021-06-04 ENCOUNTER — COMMUNICATION - HEALTHEAST (OUTPATIENT)
Dept: CARDIOLOGY | Facility: CLINIC | Age: 71
End: 2021-06-04

## 2021-06-04 VITALS
DIASTOLIC BLOOD PRESSURE: 67 MMHG | WEIGHT: 214 LBS | HEART RATE: 57 BPM | BODY MASS INDEX: 32.54 KG/M2 | SYSTOLIC BLOOD PRESSURE: 122 MMHG

## 2021-06-05 VITALS
RESPIRATION RATE: 16 BRPM | WEIGHT: 219 LBS | BODY MASS INDEX: 33.3 KG/M2 | SYSTOLIC BLOOD PRESSURE: 126 MMHG | HEART RATE: 58 BPM | OXYGEN SATURATION: 98 % | DIASTOLIC BLOOD PRESSURE: 78 MMHG

## 2021-06-08 ENCOUNTER — RECORDS - HEALTHEAST (OUTPATIENT)
Dept: ADMINISTRATIVE | Facility: OTHER | Age: 71
End: 2021-06-08

## 2021-06-08 DIAGNOSIS — I48.0 PAROXYSMAL ATRIAL FIBRILLATION WITH RAPID VENTRICULAR RESPONSE (H): ICD-10-CM

## 2021-06-08 RX ORDER — APIXABAN 5 MG/1
TABLET, FILM COATED ORAL
Qty: 180 TABLET | Refills: 1 | Status: SHIPPED | OUTPATIENT
Start: 2021-06-08 | End: 2022-10-25

## 2021-06-08 NOTE — PROGRESS NOTES
Review of Systems - History obtained from the patient  General ROS: negative  Psychological ROS: negative  Ophthalmic ROS: negative  ENT ROS: negative  Allergy and Immunology ROS: negative  Hematological and Lymphatic ROS: negative  Endocrine ROS: negative  Respiratory ROS: negative  Cardiovascular ROS: negative  Gastrointestinal ROS: negative  Genito-Urinary ROS: negative  Musculoskeletal ROS: negative  Neurological ROS: negative  Dermatological ROS: positive for rash

## 2021-06-14 NOTE — TELEPHONE ENCOUNTER
pc to patient.  S/p PVI in 3/2019.  Last seen by Dr. Lu 6/2020, with no recurrence since procedure.  Remains on eliquis 5mg two times a day.     Calling to report an episode of afib that started yesterday at 10:00 while scraping some ice off the sidewalk.  He denied symptoms other than feeling his pulse bouncing around.  Denied tachycardia.  Denies illness or possible triggers.    This morning he noted he converted on his way to work, and is feeling well now.    He agrees to follow up with EP NP to discuss plans moving forward.  He is encouraged to call if this happens again as we will try to catch it on EKG.  Pt given new EP RN contact information.    Scheduling notified to call pt.  CHRIS

## 2021-06-14 NOTE — TELEPHONE ENCOUNTER
----- Message from Shani Martinez V sent at 1/11/2021  8:30 AM CST -----  General phone call: pt went into afib 10;00 yesterday and then 8:00 today he went back into normal sinus.  Pt had an ablation a couple years ago with Dr. Lu. Pt would like to speak with someone to see if he needs to be seen.      Caller: Kev  Primary cardiologist: NAZANIN  Detailed reason for call: Pt went back into afib  New or active symptoms? yes  Best phone number: [phone]  Best time to contact: ANYTIME  Ok to leave a detailed message? yes  Device? no    Additional Info:

## 2021-06-14 NOTE — PATIENT INSTRUCTIONS - HE
Conrad Carson,    It was a pleasure to see you today at the [facility].     My recommendations after this visit include:    Continue current medications.    Consider getting a [employer] mobile edie to document A fib if it recurs    Call if you have frequent or prolonged A fib    Follow up in 1 year, or sooner if needed    Anisha Adamson, [facility], Electrophysiology  [phone]  EP nurses [phone]

## 2021-06-14 NOTE — TELEPHONE ENCOUNTER
Wellness Screening Tool  Symptom Screening:  Do you have one of the following NEW symptoms:    Fever (subjective or >100.0)?  No    A new cough?  No    Shortness of breath?  No     Chills? No     New loss of taste or smell? No     Generalized body aches? No     New persistent headache? No     New sore throat? No     Nausea, vomiting, or diarrhea?  No    Within the past 2 weeks, have you been exposed to someone with a known positive illness below:    COVID-19 (known or suspected)?  No    Chicken pox?  No    Mealses?  No    Pertussis?  No    Patient notified of visitor policy- No visitors are allowed to accompany the patient at this time. Yes  Pt informed to wear a mask: Yes  Pt notified if they develop any symptoms listed above, prior to their appointment, they are to call the clinic directly at [phone] for further instructions.  Yes  Patient's appointment status: Patient will be seen in clinic as scheduled on 1/18

## 2021-06-16 PROBLEM — Z98.890 STATUS POST CATHETER ABLATION OF ATRIAL FIBRILLATION: Status: ACTIVE | Noted: 2019-03-27

## 2021-06-16 PROBLEM — I48.0 PAROXYSMAL ATRIAL FIBRILLATION (H): Status: ACTIVE | Noted: 2018-11-07

## 2021-06-16 NOTE — PROGRESS NOTES
[facility]--Electrophysiology    Assessment/Plan:      Problem List Items Addressed This Visit     Hypertension    Adult Sleep Apnea    Paroxysmal atrial fibrillation with rapid ventricular response - Primary        1.  Paroxysmal atrial fibrillation: Very infrequent episodes for which he has undergone early cardioversion on 2 occasions, most recently 3/3/2018.  We had a lengthy discussion of the physiology and natural progression of atrial fibrillation and treatment options including rate control versus rhythm control with medications or pulmonary vein isolation ablation. We reviewed the importance of avoiding possible triggers.  We further discussed pulmonary vein isolation ablation procedure including <1% risk for complication and expected recovery.  We also discussed that ablation has a higher success rate when done earlier in A. fib progression.  He was given some information to review at home.  He would be a good candidate for ablation, but for now would prefer watchful waiting.      He was reassured that atrial fibrillation is not life-threatening, but does carry increased risk for stroke.  He has a [id number] score of 2 for age 65-74 and hypertension; per current guidelines anticoagulation is recommended, unless risks outweighed the benefits.  We discussed the risks and benefits of warfarin and the novel agents Eliquis, Pradaxa, and Xarelto.  As his episodes are very infrequent and he knows precisely when they begin he chooses to remain on aspirin 325 mg daily.  We discussed the need for starting OAC if his episodes become more frequent and before/after PVI if he opts for ablative therapies.    He was instructed to call if a fib episodes increase in frequency or duration and if an episode persists, to call within the first 12-24 hours from onset and remain NPO for possible cardioversion or until other instructions have been received.     2.  Hypertension: Blood pressure at target today.    3.   Obstructive sleep apnea: Consistent use of CPAP nightly.    Follow-up with Dr. Tamez on 4/20/2018 as previously scheduled.    Subjective:      Conrad Carson , a very pleasant 67-year-old gentleman, comes in today for EP evaluation of atrial fibrillation.  He has a history of atrial fibrillation since 2009.  Symptoms consist of a fluttering heartbeat, and in indigestion type sensation that goes up to the base of his throat.  He is immediately aware of going into and coming out of A. fib.  He underwent early cardioversion in 2009, and again on 3/3/2018.  He also had an episode that spontaneously converted back to sinus rhythm without intervention during his prostatectomy in December 2013.  He reports that over the past 10 years he has had maybe 4 or 5 episodes in total.  He also has a history of hypertension and hyperlipidemia, but no known coronary or valvular disease.  He also has a history of sleep apnea for which he wears CPAP nightly and has a reevaluated on an annual basis.    Kev states that he feels well and remains very active.  He denies chest discomfort, palpitations, shortness of breath, paroxysmal nocturnal dyspnea, orthopnea, lightheadedness, dizziness, pre-syncope, or syncope.    Medical, surgical, family, social history, and medications were reviewed and updated as necessary.    Patient Active Problem List   Diagnosis     Dyslipidemia     Hypertension     Esophageal Reflux     Adult Sleep Apnea     Paroxysmal atrial fibrillation with rapid ventricular response     Encounter for cardioversion procedure       Past Medical History:   Diagnosis Date     Atrial fibrillation      HLD (hyperlipidemia)      HTN (hypertension)        Past Surgical History:   Procedure Laterality Date     GANGLION CYST EXCISION Right     ring finger     [place] CARDIOVERSION ELECTIVE ARRHYTHMIA EXTERNAL      10/01/2009, 3/3/18     PROSTATECTOMY  12/2013     TONSILLECTOMY         No Known Allergies    Current Outpatient  Prescriptions   Medication Sig Dispense Refill     amLODIPine (NORVASC) 10 MG tablet Take 1 tablet (10 mg total) by mouth daily. 90 tablet 3     aspirin 325 MG tablet 325 mg daily.       atenolol (TENORMIN) 50 MG tablet Take 50 mg by mouth 2 (two) times a day.       b complex vitamins capsule 1 capsule daily.       cholecalciferol, vitamin D3, (VITAMIN D3) 1,000 unit capsule 1,000 Units 2 (two) times a day.       cimetidine (TAGAMET) 800 MG tablet 400 mg daily. Take as directed.       DOCOSAHEXANOIC ACID/EPA (FISH OIL ORAL) 1 capsule 2 (two) times a day. 1200 MG Capsule       gemfibrozil (LOPID) 600 MG tablet 600 mg 2 (two) times a day.       lisinopril-hydrochlorothiazide (PRINZIDE,ZESTORETIC) 20-12.5 mg per tablet 1 tablet daily.       multivitamin (MULTIVITAMIN) per tablet 1 tablet daily.       niacin 500 MG tablet 500 mg daily. Take as directed.       omeprazole (PRILOSEC) 20 MG capsule 20 mg daily.       simvastatin (ZOCOR) 20 MG tablet Take 20 mg by mouth.       tadalafil (CIALIS) 5 MG tablet Take 5 mg by mouth daily as needed for erectile dysfunction.       VIAGRA 100 mg tablet AS DIRECTED       No current facility-administered medications for this visit.        Family History   Problem Relation Age of Onset     No Medical Problems Mother      Cancer Father      Dyslipidemia Father      Aneurysm Father      ruputre aortic aneruysm     No Medical Problems Sister      No Medical Problems Brother        Social History   Substance Use Topics     Smoking status: Never Smoker     Smokeless tobacco: Never Used     Alcohol use 1.2 oz/week     2 Shots of liquor per week       Review of Systems:   General: WNL  Eyes: WNL  Ears/Nose/Throat: WNL  Lungs: WNL  Heart: WNL (palpitations)  Stomach: WNL  Bladder: WNL  Muscle/Joints: WNL  Skin: WNL  Nervous System: WNL  Mental Health: WNL     Blood: WNL      Objective:    /78 (Patient Site: Left Arm, Patient Position: Sitting, Cuff Size: Adult Large)  Pulse 64  Resp 18  " Ht 5' 8\" (1.727 m)  Wt 218 lb (98.9 kg)  BMI 33.15 kg/m2    Physical Exam  General Appearance:  Alert, well-appearing, in no acute distress.     HEENT:  Atraumatic, normocephalic; no scleral icterus, EOM intact, PERRL; the mucous membranes were pink and moist.   Chest: Chest symmetric, spine straight.     Lungs:   Respirations unlabored; the lungs are clear to auscultation.   Cardiovascular:   Auscultation reveals normal first and second heart sounds with no murmurs, rubs, or gallops.  Regular rate and rhythm.  Radial and posterior tibial pulses are intact.    Abdomen:  Soft, nontender, nondistended, bowel sounds present.     Extremities: No cyanosis, clubbing, or edema.   Musculoskeletal:  Moves all extremities.     Skin: No xanthelasma.   Neurologic: Mood and affect are appropriate. Oriented to person, place, time, and situation.       Cardiographics (personally reviewed)  ECG 3/3/2018 shows atrial fibrillation with rapid ventricular response at 117 bpm.  Post cardioversion, ECG showed sinus rhythm at 65 bpm, QT/QTc interval measures 396/411 ms      Lab Review   Lab Results   Component Value Date    CREATININE 1.06 03/03/2018    BUN 13 03/03/2018     03/03/2018    K 3.7 03/03/2018     (H) 03/03/2018    CO2 26 03/03/2018     Lab Results   Component Value Date    TSH 1.29 10/10/2017     Lab Results   Component Value Date    WBC 8.0 03/03/2018    HGB 16.4 03/03/2018    HCT 45.7 03/03/2018    MCV 87 03/03/2018     03/03/2018         Greater than 40 minutes were spent face to face in this visit with more than 50% of the time discussing diagnoses as listed above, counseling, and coordination of care.      Anisha Adamson, [facility], Electrophysiology  [phone]    This note has been dictated using voice recognition software. Any grammatical, typographical, or context distortions are unintentional and inherent to the software.    "

## 2021-06-18 NOTE — LETTER
Letter by Sadaf Myles RN at      Author: Sadaf Myles RN Service: -- Author Type: --    Filed:  Encounter Date: 1/4/2019 Status: (Other)        Conrad Carson  [address] [place] [address]        IMPORTANT INFORMATION REGARDING YOUR      PULMONARY VEIN ISOLATION ABLATION     Pre procedure Cardiac CT/MRI :      Used to obtain images of your Pulmonary Veins prior to the procedure to assist in mapping your heart during the ablation.    A  will be calling you to set up a time for the test.    The CT or MRI should be completed at least 2 weeks prior to your ablation.    Preoperative Physical : Wednesday March 13th, 2019 at 2:50pm      Your pre operative physical is scheduled with our EP Nurse Practitioner  Anisha Adamson .     Please bring these instructions with you to your appointment.    You will meet with a Nurse for education after your visit with the EP Nurse Practitioner    Pulmonary Vein Isolation Ablation: Monday March 18th, 2019 with Dr Lu      Please arrive at 5:45 am for registration and prep.    Your procedure is scheduled for 8:00 am. Please keep in mind this time is not exact, and this time may vary based on unforseen circumstances.     Preparing at Home for your Pulmonary Vein Isolation Ablation:    Have nothing to eat or drink after midnight the night prior to your ablation.    Please DO NOT take any of your medication the morning of your procedure EXCEPT your Eliquis the morning of your ablation.    Your will need to start Pepcid 20mg twice daily 3 days prior to your ablation, and continue this for 3 weeks after your ablation. This medication will be sent to your pharmacy at your the time of your office visit in the clinic. It is important to take this medication to help reduce the discomfort caused through the process of your ablation, that potentially can cause irritation to the esophagus.    You will have general anesthesia for the procedure and will need to lay flat for 4-6  hours after the procedure.    You will need to bring a current list of your medications with you for our admissions process the day of your procedure, please do not bring any of your own medications with you to the hospital.    The hospital cannot store your valuables, so please leave them at home    You will need to take off your jewelry prior to your procedure, we advise leaving your jewelry at home, as we cannot store your valuables    We ask that you please shower the morning of your procedure, or the night before.    You will have a conteh catheter placed in your bladder prior to the procedure.  Please let us know if you have had difficulty with a conteh catheter in the past.    If you use a CPAP machine for breathing while you are sleeping, please bring this with you to the hospital.    You will stay over night for an Observation stay.    Anticoagulation:    It is important to remain on your anticoagulation medication (Eliquis) uninterrupted before and after your ablation, PLEASE DO NOT STOP THIS MEDICATION.    You may not be able to have this procedure if you skip a dose of your Eliquis within 30 days of your procedure. We encourage you to make sure you are taking this medication without skipping any doses, if you happen to miss a dose prior to your procedure please contact us to make further arrangements and If you have any questions regarding your medication prior to your procedure please contact me at the number listed below.    Postoperative Information :      Please plan on taking 5-7 days after the procedure for recuperation.    Please make arrange for transportation home, as you will not be able to drive following your procedure    We ask that you do not drive until you are seen in the clinic for your post op follow up.  This is to aide in the healing of your groin sites.  This is scheduled on Thursday March 21st, 2019 at 10:30am.    Detailed information regarding discharge instructions will be given to  you when you leave the hospital.    Follow up:  After the ablation you will be scheduled to see:    EP Nurse Clinician for an assessment and suture removal, if appropriate.    EP Nurse Practitioner in 4-6 weeks;  A 2 week heart monitor will be ordered for you to wear about 8 weeks after the ablation.    Dr Lu or the Electrophysiology Nurse Practitioner 3 months after the ablation.    If you have any questions regarding scheduling please call the scheduling line at [phone]    Travel Restrictions following procedure :      No traveling by plane for 4 weeks.    Please refrain from extended travel outside the [place] for 3 weeks.    Contact the clinic for questions.    Parking information :      Please arrive at [facility], located at: [address] [place] [address]      Parking is available at the [address] [place] [address] entrance, and is open at 5:00 am.    If you park in the ramp located on [facility] street, take the elevator to Lobby/level one.     Enter the doors to the atrium/lobby area and check in at the main reception desk.    You will be assisted to Cardiac [facility] on the third floor.     Ramp parking will be free the day of your procedure and reduced to $4.00 for each visit after.      Please ask at the main reception desk in the lobby or on the third floor for parking validation.    Contact Information :      Please call with any questions or concerns regarding the procedure:Sadaf Myles RN [phone]      Scheduling questions or concerns: Liza Najera at [phone]                        Thank you for choosing [facility].        Information on Atrial Fibrillation Ablations    What is Catheter Ablation?             A Catheter Ablation is a procedure that treats certain types of abnormal heart rhythms (arrhythmia). There are several components to the procedure, but the final purpose is target and destroy (ablate) small areas of your heart muscle  that are causing the arrhythmia.     Why is an Ablations Done?  A catheter ablation is an effective way to treat some types of abnormal heart rhythms. An ablation is a relatively low risk procedure that may permanently cure your abnormal heart rhythm.  The ablation process damages the heart cells which results in scarring of that area. The scar is electrically inactive and can produce a permanent cure for the abnormal rhythm.  Ablation procedures can help avoid the need for rhythm medications and give patients the ability to return to their normal activity and live an active life. In patients that do not have symptoms, ablations are not typically done as there may still be an increased risk of stroke.    Why is Catheter Ablation Done?  Sometimes, the hearts electrical system does not work properly.  This can cause abnormal heart rhythms, called arrhythmias.  During an arrhythmia, the heart may beat too fast, too slowly, or irregularly.  Your doctor has recommended catheter ablation to treat a rapid (fast) heart rhythm, or tachycardia.      How Catheter Ablation Is Done  Catheter ablation uses thin, flexible wires called electrode catheters to find and destroy (ablate) problem cells. Here is how the procedure is done:    The pulmonary veins will be treated first. There are currently two tools used to ablate around the pulmonary veins.  1) Radiofrequency catheter will heat the tissue.   2)   Cryo-balloon catheter will freeze the tissue.       Testing will be done to confirm that effective treatment has been delivered.       Further testing may be performed to see if a fib is still present or if some other rhythm problem such as atrial flutter is present. If an ongoing rhythm problem is discovered then further ablation can be done to isolate and destroy those areas responsible for the arrhythmia.       Your Experience during Catheter Ablation  In most cases, catheter ablations are done in an electrophysiology (EP) lab.  Depending on your arrhythmia it often takes 4-6 hours, and sometimes longer.     The procedural area: You will be transferred back to the procedure room once you have been appropriately prepped by the nursing staff_ and you are ready for your ablation.     Sedation: You to be put completely asleep for your ablation using general anesthesia.    While you are asleep a bladder catheter will be inserted. This will be removed after your bedrest is complete.    Inserting the catheters: You will have 3-4 catheters inserted into the veins. Catheter locations can include the shoulder, neck, and groins. Catheters are guided to the heart with the help of x-ray monitors.    Finishing up: When the procedure is finished, the catheters are taken out of your body. A special closure device may be used to help seal these puncture sites. Youre then taken to your room to rest, and will be cared for by an intensive care unit (ICU) nurse.      Risks and Complications  The risks of catheter ablation are fairly low compared to the benefits you receive. Possible risks and complications include:    Common (up to 10%)  o Bleeding or bruising  o Shortness of breath  o Heartburn    Uncommon (< 1%)  o Blood clots  o A slow heart rhythm (requiring a permanent pacemaker)  o Perforation of the heart muscle, blood vessel, or lung (may require an emergency procedure)  o Stroke  o Damage to a heart valve   o Heart attack, also known as acute myocardial infarction, or AMI   o Death (extremely rare)    Before your Catheter Ablation  Before your catheter ablation, you will meet with the electrophysiologist (specially trained heart doctor) who will do the procedure. The provider or a registered nurse will provide you with detailed instructions on how to prepare for this procedure, some of these instructions are listed below.    You will likely be told to stop or change your heart rhythm medications for a period of time before your ablation.     You may  have testing done several days prior to your ablation or the morning of your ablation, such as an ECG, x-ray, blood tests, or echocardiogram.     You will not be allowed to eat or drink 8 hours before your ablation. You will be given further instructions by your physician or a registered nurse regarding the medication you will take the morning of your procedure.    You will need to arrange to have a  home from the hospital; you will not be permitted to drive after your procedure due to the sedation that you receive.     You are allowed to bring personal items and clothing to the hospital, but please refrain from bringing any valuables as the hospital is not responsible for any lost or stolen items.    You will need to bring a list of the names and dosages of the medication you are taking to the hospital.    It is important to mention to your doctor or registered nurse if you have any allergies, reactions to anesthesia, or have had history of bleeding problems.     Arriving at the Hospital the morning of your Catheter Ablation  You will need to check in at the 1st floor entrance at the [facility] the morning of your ablation, you will then be escorted to the 3rd floor where they will prepare you for your ablation and where your ablation will be performed.    When you arrive on the floor the doctor or registered nurse will meet with you prior to your ablation, this is a good time to ask questions and address any concerns you may have. You will then be asked to sign the consent form for your ablation, if this has not already been done.    The nursing staff will begin to prepare you for your procedure:    A nurse will shave and cleanse the area where the ablation catheters will be placed. These areas are most commonly the left and right groin sites (the fold between your thigh and abdomen), and in some cases the chest, arm, and neck. This is done to reduce the risk of infection.      The  nursing staff will start an intravenous (IV) line into a vein in your arm, which allows the staff to give you medication and sedatives to help you relax prior and during your ablation.    In some cases, the nursing staff will need to place a catheter that will drain urine from your bladder (Bertrand Catheter), which is required due to the length and complexity of the ablation you are having.    After Catheter Ablation  Recovery immediately after your ablation in the hospital  After your catheter ablation procedure, you will be taken to a recovery room. You may need to lie flat for 2-6 hours while the insertion sites close up. During this time, a nurse will monitor you, and you will be given medication to make you comfortable. This ablation requires you to stay in the hospital overnight.    Going Home  When it is time to go home, your will need to have an adult family member or friend drive you. Most people can walk, climb stairs, and perform light activity soon after catheter ablation. You can most likely return to your full routine within a few days. However, you may be told to avoid running, heavy lifting, and other strenuous activities for a short time. Please make sure to follow any specific activity restrictions provided by the medical staff at the time of your discharge from the hospital.    Doctor's typically advise that you not drive until your post procedure assessment visit in the clinic.     Avoid heavy physical activity and heavy lifting for several days after the procedure to allow your body to heal.    Ask your doctor when you can expect to return to work.    Take your temperature and check your incision for signs of infection (redness, swelling, drainage, or warmth) every day for a week. It is normal to have a small bruise or lump where the catheter was inserted.    Take your medications exactly as directed. Do not skip doses or stop medication without consulting your physician prior.    Learn to take  your own pulse and keep a record of your results.    Follow-Up  After your ablations you will have a follow up visit to see how you are doing, to assess your rhythm after your ablation, and to address any medication changes if necessary. In many cases, one ablation is enough to treat an arrhythmia. However, sometimes the problem returns or another is found. If this happens, you may need a second catheter ablation. Tell your healthcare provider if you have any new or returning symptoms.    Common Symptoms after Catheter Ablation  In the first few weeks after catheter ablation, you may feel mild chest fullness or aching. You may also feel as if your heart is skipping beats or your heartbeat may feel faster than normal. You may think that your heart rhythm problem is about to return. These sensations are normal and usually go away with time. Talk to your healthcare provider if you are concerned.    When to Call Your Doctor    Increased bleeding, bruising, or pain at the insertion site    Episodes of atrial fibrillation are common post procedure, call the clinic if episodes are lasting longer than 4-6 hours    Difficulty with your speech or walking, or any visual disturbance    Lightheaded, dizziness, or feeling faint    Shortness of breath or chest pain    Coldness, swelling, or numbness of the arm or leg near the insertion site    A bruise or lump at the insertion site that is larger than a walnut    A fever over 100 F

## 2021-06-20 NOTE — PROGRESS NOTES
Thank you for asking the [facility] team to see Conrad Carson in consultation.      Assessment/Plan:   Recurrent persistent atrial fibrillation - now in sinus bradycardia and feeling well. Will continue eliquis and diltiazem, along with his other medications and refer to afib clinic for ablation consideration. We discussed the factors that would help make ablation successful, including weight loss. I recommended really limiting/cutting out flour, sugar, rice and potatoes, which will be very hard for him as he loves bread, pizza, sugary cereals, and mini Sofie candy bars. I encouraged him to fill 3/4 of his plate with colorful vegetables and the last quarter with a lean protein. Moderate exercise for 30 continuous minutes a day recommended.    F/U in afib clinic and with Dr. Tamez     Current History:   Conrad Carson is a 68 y.o. with history of persistent atrial fibrillation who follows with Dr. Tamez. He has had 3 previous episodes of afib (2 cardioverted with shocks, one on its own) each  by several years with the last being this past spring. Kev was seen in the ER earlier this week after going back into atrial fibrillation on Monday, around 2pm. In the ER he was given diltiazem IV and fluids and eliquis, but no cardioversion was done in order to explore other options. Kev continued to notice some symptoms for about 24 hours after that but has not felt anything in the past 36 hours. He is working and going up/down the stairs at home without any trouble. There is no pnd/orthopnea, edema, chest pain. He is wearing his CPAP.     Past Medical History:     Past Medical History:   Diagnosis Date     Atrial fibrillation (H)      HLD (hyperlipidemia)      HTN (hypertension)      Prostate cancer (H)      Sleep apnea        Past Surgical History:     Past Surgical History:   Procedure Laterality Date     GANGLION CYST EXCISION Right     ring finger     [place] CARDIOVERSION ELECTIVE  ARRHYTHMIA EXTERNAL      10/01/2009, 3/3/18     PROSTATECTOMY  12/2013     TONSILLECTOMY         Family History:     Family History   Problem Relation Age of Onset     No Medical Problems Mother      Cancer Father      Dyslipidemia Father      Aneurysm Father      ruputre aortic aneruysm     No Medical Problems Sister      No Medical Problems Brother        Social History:    reports that he has never smoked. He has never used smokeless tobacco. He reports that he drinks about 1.2 oz of alcohol per week  He reports that he does not use illicit drugs.    Meds:     Current Outpatient Prescriptions   Medication Sig Note     apixaban (ELIQUIS) 5 mg (74 tabs) DsPk Take 1 tablet by mouth 2 (two) times a day.      aspirin 325 MG tablet 325 mg daily.      atenolol (TENORMIN) 50 MG tablet Take 50 mg by mouth 2 (two) times a day.      b complex vitamins capsule 1 capsule daily.      cholecalciferol, vitamin D3, (VITAMIN D3) 1,000 unit capsule 1,000 Units 2 (two) times a day.      cimetidine (TAGAMET) 800 MG tablet 400 mg daily. Take as directed.      diltiazem (CARDIZEM CD) 180 MG 24 hr capsule Take 1 capsule (180 mg total) by mouth daily.      DOCOSAHEXANOIC ACID/EPA (FISH OIL ORAL) 1 capsule 2 (two) times a day. 1200 MG Capsule      gemfibrozil (LOPID) 600 MG tablet 600 mg 2 (two) times a day.      lisinopril-hydrochlorothiazide (PRINZIDE,ZESTORETIC) 20-12.5 mg per tablet 1 tablet daily.      multivitamin (MULTIVITAMIN) per tablet 1 tablet daily.      niacin 500 MG tablet 500 mg daily. Take as directed.      omeprazole (PRILOSEC) 20 MG capsule 20 mg daily.      simvastatin (ZOCOR) 20 MG tablet Take 20 mg by mouth.      ketoconazole (NIZORAL) 2 % cream  9/20/2018: Received from: External Pharmacy     tadalafil (CIALIS) 5 MG tablet Take 5 mg by mouth daily as needed for erectile dysfunction.      VIAGRA 100 mg tablet AS DIRECTED 3/14/2016: Received from: External Pharmacy Received Sig:        Allergies:   Review of patient's  "allergies indicates no known allergies.    Review of Systems:   Review of Systems:   General: WNL  Eyes: WNL  Ears/Nose/Throat: WNL  Lungs: WNL  Heart: Irregular Heartbeat  Stomach: WNL  Bladder: WNL  Muscle/Joints: WNL  Skin: WNL  Nervous System: WNL  Mental Health: WNL     Blood: WNL       Objective:      Physical Exam  @LASTENCWT:3@  5' 8\" (1.727 m)  @BMI:3@  /82 (Patient Site: Left Arm, Patient Position: Sitting, Cuff Size: Adult Large)  Pulse (!) 56  Resp 14  Ht 5' 8\" (1.727 m)  Wt 219 lb (99.3 kg)  BMI 33.3 kg/m2    General Appearance:   Alert, cooperative and in no acute distress.   HEENT:  No scleral icterus; the mucous membranes were pink and moist.   Neck: JVP flat. No thyromegaly. No HJR   Chest: The spine was straight. The chest was symmetric.   Lungs:   Respirations unlabored; the lungs are clear to auscultation.   Cardiovascular:   S1 and S2 normal and without murmur. No clicks or rubs. No carotid bruits noted. Right DP, PT, and radial pulses 2+. Left DP, PT, and radial pulses 2+.   Abdomen:  No organomegaly, masses, bruits, or tenderness. Bowels sounds are present   Extremities: No cyanosis, clubbing, or edema.   Skin: No xanthelasma.   Neurologic: Mood and affect are appropriate.         Lab Review   Lab Results   Component Value Date     09/17/2018     03/03/2018     10/10/2017    K 3.6 09/17/2018    K 3.7 03/03/2018    K 3.9 10/10/2017     (H) 09/17/2018     (H) 03/03/2018     10/10/2017    CO2 25 09/17/2018    CO2 26 03/03/2018    CO2 26 10/10/2017    BUN 15 09/17/2018    BUN 13 03/03/2018    BUN 14 10/10/2017    CREATININE 1.15 09/17/2018    CREATININE 1.06 03/03/2018    CREATININE 1.15 10/10/2017    CALCIUM 10.0 09/17/2018    CALCIUM 10.0 03/03/2018    CALCIUM 9.8 10/10/2017     Lab Results   Component Value Date    WBC 8.2 09/17/2018    WBC 8.0 03/03/2018    WBC 6.0 10/10/2017    HGB 16.6 09/17/2018    HGB 16.4 03/03/2018    HGB 14.8 10/10/2017    " HCT 46.0 09/17/2018    HCT 45.7 03/03/2018    HCT 40.9 10/10/2017    MCV 87 09/17/2018    MCV 87 03/03/2018    MCV 90 10/10/2017     09/17/2018     03/03/2018     10/10/2017     Lab Results   Component Value Date    CHOL 176 10/10/2017    CHOL 189 11/11/2016    CHOL 189 12/09/2015    TRIG 168 (H) 10/10/2017    TRIG 129 11/11/2016    TRIG 235 (H) 12/09/2015    HDL 30 (L) 10/10/2017    HDL 33 (L) 11/11/2016    HDL 29 (L) 12/09/2015     No results found for: BNP      Lisseth Watson M.D.

## 2021-06-20 NOTE — LETTER
Letter by Ashley Joy RN at      Author: Ashley Joy RN Service: -- Author Type: --    Filed:  Encounter Date: 7/29/2020 Status: (Other)         Conrad Carson  [address] [place] [address]             July 29, 2020         Dear Mr. Carson,    Below are the results from your recent visit:    Resulted Orders   Holter Monitor    Narrative    HOLTER MONITOR    INTERPRETATION DATE:  06/22/2020    TEST DATE:  06/19/2020    INTERPRETATION:  Predominant rhythm is sinus rhythm with rates ranging   from 40 beats  per minute at night to 103 beats per minute.  There were no clinically   significant  pauses.  Borderline first-degree AV block was present.  Rare single atrial   premature  beats were noted, 12 over 24 hours.  There was no atrial fibrillation.    Rare single  ventricular premature beats were also noted, 7 over 24 hours.  No symptoms   were  described in the monitor.      CONCLUSION:  Normal Holter.  Borderline first-degree AV block noted.      NABEEL MARTINEZ MD  pn  D 06/22/2020 13:48:39  T 06/22/2020 15:13:53  R 06/22/2020 15:13:53  [id number]      cc: REINALDO LU MD        Your recent test results are listed above.  Dr Lu has reviewed the results, they are normal, and at this time would not like to make any further changes.    If you have any questions or concerns, please don't hesitate to call.    Sincerely,    Ashley Joy RN  [phone]

## 2021-06-21 NOTE — PROGRESS NOTES
1950  Home:[phone] (home) Cell:[phone] (mobile)  Emergency Contact: Manju Carson [phone]    +++Important patient information for CSC/Cath Lab staff : None+++    [facility] EP Cath Lab Procedure Order   Ablation Type:  PVI- Atrial Fibrillation  Specific location of pathway: N/A     Diagnosis:  AF  Anticipated Case Duration:  4-One PVI a day   Scheduling Needs/Timeframe:  Next AvailablePlease call pt to schedule    MD Preference: Dr Aly OBREGON Assist:  No Specific MD:  N/A    Current Device: None None  Device Company/Device Rep Needed for Procedure: None    Pre-Procedural Testing needed: Pulmonary Vein CT/MRI, ECHO  Mapping System Required:  TANYA  ICE Needed:  Yes  Special equipment/Staff needed for case: None  Anesthesia:  General-Whole Case  Research Protocol:  No    [facility] EP Cath Lab Prep   Ordering Provider: Dr Lu  Ordering Date: 11/7/2018  Orders Status: Intial order placed and Order set placed  EP NC Contact: Ginette Tamez RN    H&P:  Schedule apt with EP NP to complete within 1 wk of scheduled PVI  PCP: Gael Boateng MD, [phone]    Pre-op Labs: Ordered AM of procedure    Medical Records Pertinent for Procedure:  Cardioversion 3/3/18, 12/13/15 and EKG 9-20-18 SB;   9-17-18 Afib @130    Patient Education:    Teach with Patient: Teach with pt will be completed same day as pre-op H&P-see additional clinic note    Risks Reviewed:     Pulmonary Vein/AF/Radiofrequency Ablation  In addition to standard risks for Radiofrequency Ablation, there is:    <2% for significant pulmonary vein stenosis    <2% risk for embolic events    <1% risk for esophageal fistula    <1% risk death    Pulmonary Vein Isolation / Cryoablation Risks:    1-2% risk for phrenic nerve paralysis    <1% risk for pulmonary vein stenosis    Risk of esophageal irritation with no incidence of atrial-esophageal fistula    Rare cryoballoon rupture    <1% risk death         Cardiac Catheter Ablation    <1% risk for the following:  hypotension, hemorrhage, vascular injury including perforation of vein, artery or heart, thrombophlebitis, systemic or pulmonic emboli; cardiac perforation, (tamponade), infection, pneumothorax, arrhythmias, proarrhythmic effects of drugs, radiation exposure.    1-2% complete heart block (for AVNRT or septol accessory pathway).    <0.5% CVA or MI    <0.1% death    If external defibrillation is needed, 75% risk for superficial burn.    1-2% tamponade and aortic puncture with left sided transeptal approach for left side TANYA - increase risk of CVA to <2%.    Late arrhythmia recurrences depends upon the primary rhythm disturbance.      Consent: Will be obtained in CSC day of procedure    Pre-Procedure Instructions that were Reviewed with Patient:  NPO after midnight, Remove all jewelry prior to coming in for procedure, Shower prior to arrival, Notified patient of time and date of procedure by CV , Transportation arrangements needed s/p procedure, Post-procedure follow up process, Sedation plan/orders and Pre-procedure letter was sent to pt by CV     Pre-Procedure Medication Instructions:  Instructions given to pt regarding anticoagulants: Eliquis- instructed to continue anticoagulation uninterrupted through their procedure  Instructions given to pt regarding antiarrhythmic medication: None; N/A  Instructions for medication, other than anticoagulants/antiarrhythmics listed above, given to pt: to hold all morning medications with the exception of Eliquis the am of the procedure    No Known Allergies    Current Outpatient Prescriptions:      apixaban (ELIQUIS) 5 mg Tab tablet, Take 1 tablet (5 mg total) by mouth 2 (two) times a day., Disp: 180 tablet, Rfl: 4     atenolol (TENORMIN) 50 MG tablet, Take 50 mg by mouth 2 (two) times a day., Disp: , Rfl:      b complex vitamins capsule, 1 capsule daily., Disp: , Rfl:      cholecalciferol, vitamin D3, (VITAMIN D3) 1,000 unit capsule, 1,000 Units 2 (two) times a  day., Disp: , Rfl:      cimetidine (TAGAMET) 800 MG tablet, 400 mg daily. Take as directed., Disp: , Rfl:      diltiazem (CARDIZEM CD) 180 MG 24 hr capsule, Take 1 capsule (180 mg total) by mouth daily., Disp: 90 capsule, Rfl: 3     DOCOSAHEXANOIC ACID/EPA (FISH OIL ORAL), 1 capsule 2 (two) times a day. 1200 MG Capsule, Disp: , Rfl:      gemfibrozil (LOPID) 600 MG tablet, 600 mg 2 (two) times a day., Disp: , Rfl:      ketoconazole (NIZORAL) 2 % cream, , Disp: , Rfl:      lisinopril-hydrochlorothiazide (PRINZIDE,ZESTORETIC) 20-12.5 mg per tablet, 1 tablet daily., Disp: , Rfl:      multivitamin (MULTIVITAMIN) per tablet, 1 tablet daily., Disp: , Rfl:      niacin 500 MG tablet, 500 mg daily. Take as directed., Disp: , Rfl:      omeprazole (PRILOSEC) 20 MG capsule, 20 mg daily., Disp: , Rfl:      simvastatin (ZOCOR) 20 MG tablet, Take 20 mg by mouth., Disp: , Rfl:      tadalafil (CIALIS) 5 MG tablet, Take 5 mg by mouth daily as needed for erectile dysfunction., Disp: , Rfl:      VIAGRA 100 mg tablet, AS DIRECTED, Disp: , Rfl:     Documentation Date:11/7/2018 2:58 PM  Sadaf Myles RN

## 2021-06-22 NOTE — TELEPHONE ENCOUNTER
Attempted to contact pt, voicemail message was left with contact information and instructing pt to call back.   Pt was called to update him that he needs to have Echo prior to procedure.  Echo Order placed.  Scheduling also notified of shorter case time needed.  1/2/2019 8:06 AM  Ashley Joy RN    Per Dr Lu:  EP NP note reviewed.   Pt with symptomatic PAF.   Agree pt is an appropriate candidate for PVI.  Ok to schedule.  Schedule for 4 hrs (ok for double up)  Cardiac MR or CT  No OBI  Yes, pt should have a TTE prior to PVI.

## 2021-06-22 NOTE — PROGRESS NOTES
EP NP note reviewed.   Pt with symptomatic PAF.   Agree pt is an appropriate candidate for PVI.  Ok to schedule.  Schedule for 4 hrs (ok for double up)  Cardiac MR or CT  No OBI  Yes, pt should have a TTE prior to PVI.

## 2021-06-22 NOTE — TELEPHONE ENCOUNTER
VM back from pt attempting to return call  Attempted to contact pt, detailed message discussing the below with contact information was left per pt request.  1/3/2019 11:44 AM  Ashley Joy RN

## 2021-06-24 NOTE — TELEPHONE ENCOUNTER
VM back from pt  He was seemingly upset that someone contacted him yesterday to remind him this was needed prior to PVI, but then no one called him yesterday to follow up on scheduling this  To note, pt was called and left several msg to schedule CT scan with documentation that pt said he wanted to call back to set this up and also verify with his insurance co  Pt requesting CB today to schedule  Scheduling notified,  again left VM on both work and home line  Zina

## 2021-06-24 NOTE — PROGRESS NOTES
PVI education was completed:     See documented H&P completed by NP in EPIC    Reviewed pre and post op PVI procedure with pt, pre-procedure letter reviewed and given to pt- see letter in EPIC under documentation, see additional EP PVI prep note for details on procedure/prep, answered pt questions and concerns regarding procedure, time spent with pt was >45min and reviewed available pre-op lab results    Discussed importance of continuing anticoagulation uninterrupted pre and post ablation and pt denies issues with conteh placement in the past     Pt has missed 2 doses of Eliquis with in the last week.  Discussed with pt that we may need OBI prior to procedure.  Message sent to Dr. Lu to discuss and will be contacting the patient regarding the possible need to schedule OBI once we have Dr. Lu's response.     3/13/2019 4:16 PM  Sadaf Myles RN

## 2021-06-24 NOTE — PATIENT INSTRUCTIONS - HE
Conrad Carson,    It was a pleasure to see you today at the [facility].     My recommendations after this visit include:    Continue current medications.     Ablation with Dr. Lu on 3/18/19.  OBI to be scheduled prior to ablation.    Post ablation check on 3/21/19  Follow up with me 6 weeks after ablation.    Anisha Adamson, [facility], Electrophysiology  [phone]  Parris (nurse) [phone]

## 2021-06-24 NOTE — TELEPHONE ENCOUNTER
Patient is scheduled for PVI 3/18/19 and hasn't had CT scheduled yet.  Per scheduling notes patient waiting to hear if procedure was covered by insurance before scheduling CT.    Left voicemail for patient with contact information to confirm with patient.  JW

## 2021-06-25 NOTE — TELEPHONE ENCOUNTER
Phone call to patient to discuss his rescheduled PVI with Dr. Lu on 3-27-19.  H&P on 3-13-19 completed by Anisha Adamson, labs will be reordered for am of procedure.  No medication changes needed prior to procedure.  Pt states he will not miss any doses of Eliquis prior to procedure and verifies that he did not skip any doses when he was ill with the stomach flu.   Pt will remain NPO after Midnight, take a dose of Eliquis in the am, arrival time at 0545.  All other instructions reviewed at in clinic teach 3-13-19.  Answered all questions and patient states understanding, reviewed contact information for further questions.

## 2021-06-25 NOTE — PROGRESS NOTES
Progress Notes by Dave Tamez MD at 4/5/2017  8:10 AM     Author: Dave Tamez MD Service: -- Author Type: Physician    Filed: 4/5/2017  8:30 AM Encounter Date: 4/5/2017 Status: Signed    : Dave Tamez MD (Physician)           Click to link to [facility] HEART CARE NOTE    Thank you, Dr. Boateng, for asking us to see Conrad Carson at the [facility].      Assessment/Recommendations   She with known hypertension, sleep apnea, and paroxysmal atrial fibrillation.  Atrial fibrillation seems to be quite stable.  His blood pressure is a bit high today and he reports that it has been high.  We will increase his amlodipine to 10 mg each day.  He will wait a week and then start checking his blood pressure on a regular basis and call us with 6-10 blood pressures in about 3 weeks.    I have strongly encouraged him to walk briskly for 30 minutes at least 5 times each week.  If he has any difficulty with this I would have a low threshold to put him back on a stress test.    If he remains stable I will see him back in 1 year, but a course would be happy to see him sooner if questions or problems arise.         History of Present Illness    Mr. Conrad Carson is a 66 y.o. male with known hypertension, paroxysmal atrial fibrillation, and sleep apnea.  He has been stable this past year without any episodes of atrial fibrillation or palpitations.  He does admit to not exercising on a regular basis but tries to get out for a 10 minute walk at work both in the morning and the afternoon.  He is not always successful in this regard.  He does report a fair amount of stress at work which he thinks it may be driving his blood pressure up.  He feels like his blood pressure has been a bit elevated of late.  He does take antihypertensive medications including amlodipine 5 mg each day.    He denies chest discomfort and uses a CPAP mask so does not have orthopnea or  paroxysmal nocturnal dyspnea.  He denies palpitations, or peripheral edema.  He denies unusual shortness of breath with activity.  He has had a couple of episodes where he moved his head quickly or had his neck bent and felt a little dizzy or woozy for 2 or 3 seconds.  These have passed quickly.         Physical Examination Review of Systems   Vitals:    04/05/17 0759   BP: 151/88   Pulse: 60     Body mass index is 33.15 kg/(m^2).  Wt Readings from Last 3 Encounters:   04/05/17 218 lb (98.9 kg)   03/14/16 209 lb (94.8 kg)   12/13/15 205 lb (93 kg)     General Appearance:   Alert, cooperative and in no acute distress.   ENT/Mouth: Oral mucuos membranes pink and moist .      EYES:  No scleral icterus. No Xanthelasma.    Neck: JVP normal. No Hepatojugular reflux. Thyroid not visualized   Chest/Lungs:   Lungs are clear to auscultation, equal chest wall expansion    Cardiovascular:   S1, S2 without murmur ,clicks or rubs. Brachial, radial and posterior tibial pulses are intact and symetric. No carotid bruits noted   Abdomen:  Nontender. BS+. No bruits.      Extremities: No cyanosis, clubbing and trace pretibial edema.   Skin: no xanthelasma, warm.    Psych: Appropriate affect.   Neurologic: normal gait, normal  bilateral, no tremors        General: WNL  Eyes: WNL  Ears/Nose/Throat: WNL  Lungs: WNL  Heart: WNL  Stomach: WNL  Bladder: WNL  Muscle/Joints: WNL  Skin: WNL  Nervous System: WNL  Mental Health: WNL     Blood: WNL     Medical History  Surgical History Family History Social History   Past Medical History:   Diagnosis Date   ? Atrial fibrillation    ? HLD (hyperlipidemia)    ? HTN (hypertension)     Past Surgical History:   Procedure Laterality Date   ? AL CARDIOVERSION ELECTIVE ARRHYTHMIA EXTERNAL      Description: Elective Cardioversion External;  Proc Date: 10/01/2009;    No family history on file. Social History     Social History   ? Marital status:      Spouse name: N/A   ? Number of children: N/A    ? Years of education: N/A     Occupational History   ? Not on file.     Social History Main Topics   ? Smoking status: Never Smoker   ? Smokeless tobacco: Not on file   ? Alcohol use Not on file   ? Drug use: Not on file   ? Sexual activity: Not on file     Other Topics Concern   ? Not on file     Social History Narrative          Medications  Allergies   Current Outpatient Prescriptions   Medication Sig Dispense Refill   ? aspirin 325 MG tablet 325 mg daily.     ? atenolol (TENORMIN) 50 MG tablet Take 50 mg by mouth 2 (two) times a day.     ? b complex vitamins capsule 1 capsule daily.     ? cholecalciferol, vitamin D3, (VITAMIN D3) 1,000 unit capsule 1,000 Units 2 (two) times a day.     ? cimetidine (TAGAMET) 800 MG tablet 400 mg daily. Take as directed.     ? DOCOSAHEXANOIC ACID/EPA (FISH OIL ORAL) 1 capsule 2 (two) times a day. 1200 MG Capsule     ? gemfibrozil (LOPID) 600 MG tablet 600 mg 2 (two) times a day.     ? lisinopril-hydrochlorothiazide (PRINZIDE,ZESTORETIC) 20-12.5 mg per tablet 1 tablet daily.     ? multivitamin (MULTIVITAMIN) per tablet 1 tablet daily.     ? niacin 500 MG tablet 500 mg daily. Take as directed.     ? omeprazole (PRILOSEC) 20 MG capsule 20 mg daily.     ? simvastatin (ZOCOR) 20 MG tablet Take 20 mg by mouth.     ? amLODIPine (NORVASC) 10 MG tablet Take 1 tablet (10 mg total) by mouth daily. 90 tablet 3   ? tadalafil (CIALIS) 5 MG tablet Take 5 mg by mouth daily as needed for erectile dysfunction.     ? VIAGRA 100 mg tablet AS DIRECTED       No current facility-administered medications for this visit.       No Known Allergies      Lab Results    Chemistry/lipid CBC Cardiac Enzymes/BNP/TSH/INR   Lab Results   Component Value Date    CHOL 189 11/11/2016    HDL 33 (L) 11/11/2016    LDLCALC 130 (H) 11/11/2016    TRIG 129 11/11/2016    CREATININE 1.16 11/11/2016    BUN 16 11/11/2016    K 4.6 11/11/2016     11/11/2016     (H) 11/11/2016    CO2 24 11/11/2016    Lab Results    Component Value Date    WBC 8.7 12/13/2015    HGB 15.1 12/13/2015    HCT 44.9 12/13/2015    MCV 89 12/13/2015     12/13/2015    Lab Results   Component Value Date    TROPONINI <0.01 12/19/2013    TSH 2.20 11/11/2016    INR 1.23 (H) 12/19/2013

## 2021-06-25 NOTE — TELEPHONE ENCOUNTER
Spoke with Dr. Lu in person.  Per Dr. Lu pt does not need OBI prior to procedure.    Called patient and LM for him to return call.    Sadaf

## 2021-06-25 NOTE — TELEPHONE ENCOUNTER
Phone call to patient, scheduled for PVI 3-18-19.  Pt came to [facility] at his scheduled time with a fever and possible norovirus.  Pt was sent home and PVI was cancelled.    Call to patient today to discuss his status.    He states he is feeling better, no longer throwing up, fever is gone.  He has continued to take Eliquis without issue, he routinely takes Omeprazole and was not prescribed further PPI at his H&P, patient is on no antiarrythmic medication.  Pt states that he would like to be rescheduled as soon as possible.  Message sent to scheduling to contact the patient for PVI date.  Reviewed contact information for further questions or concerns.

## 2021-06-25 NOTE — TELEPHONE ENCOUNTER
Spoke to patient regarding Dr. Lu's response.  Pt denies missing anymore doses of eliquis and promises not to miss anymore between now and procedure.    [place] paperwork faxed over to [employer]- [place] Tamez and copy sent to patient via mail per his request.    No further questions or concerns at this time.    Sadaf

## 2021-06-25 NOTE — TELEPHONE ENCOUNTER
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  He knows when he is in AFib and the last time he was in AFib was 9/13/18.    His [id number] score is 2 for age 65-74 and hypertension.    Would you recommend OBI prior to PVI that is scheduled for Monday 3/18/19?  Or are you okay with moving forward with procedure with pt verbalizing he hasn't been in Afib since 9/13/18.     Thanks,  Sadaf

## 2021-06-25 NOTE — ANESTHESIA PREPROCEDURE EVALUATION
Anesthesia Evaluation      Patient summary reviewed   No history of anesthetic complications     Airway   Mallampati: II  Neck ROM: full   Pulmonary - normal exam   (+) sleep apnea,                          Cardiovascular - normal exam  Exercise tolerance: > or = 4 METS  (+) hypertension, dysrhythmias, , hypercholesterolemia,     ECG reviewed        Neuro/Psych - negative ROS     Endo/Other    (+) obesity,      GI/Hepatic/Renal    (+) GERD well controlled,             Dental - normal exam                        Anesthesia Plan  Planned anesthetic: general endotracheal  Patient vomiting this morning with loose stools.  Fever 99.9 F.  Plan to check labs, give patient 1 L fluid, and re-assess.  Dr. Lu notified.  ASA 3   Induction: intravenous   Anesthetic plan and risks discussed with: patient  Anesthesia plan special considerations: antiemetics,   Post-op plan: routine recovery

## 2021-06-26 NOTE — PROGRESS NOTES
Progress Notes by Anisha Adamson CNP at 10/31/2018  7:50 AM     Author: Anisha Adamson CNP Service: -- Author Type: Nurse Practitioner    Filed: 10/31/2018  9:42 AM Encounter Date: 10/31/2018 Status: Signed    : Anisha Adamson CNP (Nurse Practitioner)           Click to link to [facility] HEART [facility] ELECTROPHYSIOLOGY NOTE      Assessment/Recommendations   Assessment/Plan:    1.  Paroxysmal atrial fibrillation: Initially diagnosed in 2009.  Initially had he had very infrequent episodes, though has had 2 in the past year likely representing progression of his arrhythmia.  He has undergone urgent cardioversion several times, but most recently the emergency room declined in favor of medical management.  Symptoms consist of palpitations and an indigestion type sensation that goes up to the base of his throat.  We had a lengthy discussion of the physiology and natural progression of atrial fibrillation as well as treatment options of medications versus pulmonary vein isolation ablation.  He would prefer to avoid long-term medications, so is strongly leaning toward ablation. We discussed ablation, <1% risk for complication, expected recovery, and follow-up.  He was given some information to review at home.  I will have 1 of the EP nurses call him in about a week to see if he has any further questions about PVI and if he would like to schedule or wait until after he has met with Dr. Lu.    He was reassured that atrial fibrillation is not life-threatening, but carries an increased risk for stroke.  We discussed the risk for stroke with JING durán versus risk for bleed on a blood thinner and the current guidelines.  He has a [id number] score of 2 for age 65-74 and hypertension; continue Eliquis 5 mg twice daily for stroke prophylaxis.  He reports no missed doses or bleeding issues.  HAS-BLED score of 1 for age >65.      2.  Hypertension: Blood pressure at target today.    3.  Obstructive sleep  "apnea: Consistent use of CPAP nightly.    Follow-up with Dr. Lu to further discuss PVI.     History of Present Illness    Mr. Conrad Carson is a very pleasant 68 y.o. male who comes in today accompanied by his wife for EP follow-up of atrial fibrillation.  He has a history of paroxysmal atrial fibrillation since 2009.  Symptoms consist of a fluttering heartbeat and an indigestion type sensation that goes up to the base of his throat.  He is immediately aware of going into and out of A. fib.  He has undergone early cardioversion on several occasions, other episodes spontaneously converted back to sinus rhythm.  Episodes were very sporadic, but he has had 2 this year, last on 9/17/2018.  On that occasion, he went to the emergency room for urgent cardioversion, but they felt that \"the risks for cardioversion outweighed the risks of medical management\".  He was started on Eliquis 5 mg twice daily for stroke prophylaxis and diltiazem for additional rate control.  He reports the episode spontaneously terminated several hours later, but the episode lasted in total approximately 28 hours.  He believes that additional stress at work as a trigger for this episode.  He also has a history of hypertension, hyperlipidemia, and sleep apnea for which he wears CPAP nightly and has reevaluated on an annual basis.    Kev states that he is back to feeling well and has not had any further episodes of A. fib.  He denies chest discomfort, palpitations, abdominal bloating/fullness or peripheral edema, shortness of breath, paroxysmal nocturnal dyspnea, orthopnea, lightheadedness, dizziness, pre-syncope, or syncope.    Cardiographics (personally reviewed):  EKG, Done 9/20/2018 shows sinus bradycardia at 49 bpm, QT/QTc interval measures 456/411 ms  EKG 9/17/2018 shows atrial fibrillation with rapid ventricular response 133 bpm       Physical Examination Review of Systems   Vitals:    10/31/18 0758   BP: 136/78   Pulse: 72   Resp: 16 "     Body mass index is 33.45 kg/(m^2).  Wt Readings from Last 3 Encounters:   10/31/18 220 lb (99.8 kg)   09/20/18 219 lb (99.3 kg)   09/17/18 215 lb (97.5 kg)       General Appearance:   Alert, well-appearing and in no acute distress.   HEENT: Atraumatic, normocephalic.  No scleral icterus, normal conjunctivae, EOM intact, PERRL; mucous membranes pink and moist.     Chest: Chest symmetric, spine straight.   Lungs:   Respirations unlabored: Lungs are clear to auscultation.   Cardiovascular:   Normal first and second heart sounds with no murmurs, rubs, or gallops.  Regular rate and rhythm.  Radial and posterior tibial pulses are intact, no edema.   Abdomen:  Soft, nontender, nondistended, bowel sounds present   Extremities: No cyanosis or clubbing   Musculoskeletal: Moves all extremities   Skin: Warm, dry, intact.    Neurologic: Mood and affect are appropriate, alert and oriented to person, place, time, and situation    General: WNL  Eyes: WNL  Ears/Nose/Throat: WNL  Lungs: WNL  Heart: WNL  Stomach: WNL  Bladder: WNL  Muscle/Joints: WNL  Skin: WNL  Nervous System: WNL  Mental Health: WNL     Blood: WNL     Medical History  Surgical History Family History Social History   Past Medical History:   Diagnosis Date   ? Atrial fibrillation (H)    ? HLD (hyperlipidemia)    ? HTN (hypertension)    ? Prostate cancer (H)    ? Sleep apnea     Past Surgical History:   Procedure Laterality Date   ? GANGLION CYST EXCISION Right     ring finger   ? [place] CARDIOVERSION ELECTIVE ARRHYTHMIA EXTERNAL      10/01/2009, 3/3/18   ? PROSTATECTOMY  12/2013   ? TONSILLECTOMY      Family History   Problem Relation Age of Onset   ? No Medical Problems Mother    ? Cancer Father    ? Dyslipidemia Father    ? Aneurysm Father      ruputre aortic aneruysm   ? No Medical Problems Sister    ? No Medical Problems Brother     Social History     Social History   ? Marital status:      Spouse name: N/A   ? Number of children: N/A   ? Years of  education: N/A     Occupational History   ? Not on file.     Social History Main Topics   ? Smoking status: Never Smoker   ? Smokeless tobacco: Never Used   ? Alcohol use 1.2 oz/week     2 Shots of liquor per week   ? Drug use: No   ? Sexual activity: Not on file     Other Topics Concern   ? Not on file     Social History Narrative          Medications  Allergies   Current Outpatient Prescriptions   Medication Sig Dispense Refill   ? apixaban (ELIQUIS) 5 mg Tab tablet Take 1 tablet (5 mg total) by mouth 2 (two) times a day. 180 tablet 4   ? atenolol (TENORMIN) 50 MG tablet Take 50 mg by mouth 2 (two) times a day.     ? b complex vitamins capsule 1 capsule daily.     ? cholecalciferol, vitamin D3, (VITAMIN D3) 1,000 unit capsule 1,000 Units 2 (two) times a day.     ? cimetidine (TAGAMET) 800 MG tablet 400 mg daily. Take as directed.     ? diltiazem (CARDIZEM CD) 180 MG 24 hr capsule Take 1 capsule (180 mg total) by mouth daily. 90 capsule 3   ? DOCOSAHEXANOIC ACID/EPA (FISH OIL ORAL) 1 capsule 2 (two) times a day. 1200 MG Capsule     ? gemfibrozil (LOPID) 600 MG tablet 600 mg 2 (two) times a day.     ? ketoconazole (NIZORAL) 2 % cream      ? lisinopril-hydrochlorothiazide (PRINZIDE,ZESTORETIC) 20-12.5 mg per tablet 1 tablet daily.     ? multivitamin (MULTIVITAMIN) per tablet 1 tablet daily.     ? niacin 500 MG tablet 500 mg daily. Take as directed.     ? omeprazole (PRILOSEC) 20 MG capsule 20 mg daily.     ? simvastatin (ZOCOR) 20 MG tablet Take 20 mg by mouth.     ? tadalafil (CIALIS) 5 MG tablet Take 5 mg by mouth daily as needed for erectile dysfunction.     ? VIAGRA 100 mg tablet AS DIRECTED       No current facility-administered medications for this visit.       No Known Allergies   Medical, surgical, family, social history, and medications were all reviewed and updated as necessary.   Lab Results    Chemistry CBC Other   Lab Results   Component Value Date    CREATININE 1.15 09/17/2018    BUN 15 09/17/2018    NA  143 09/17/2018    K 3.6 09/17/2018     (H) 09/17/2018    CO2 25 09/17/2018     Creatinine (mg/dL)   Date Value   09/17/2018 1.15   03/03/2018 1.06   10/10/2017 1.15   11/11/2016 1.16    Lab Results   Component Value Date    WBC 8.2 09/17/2018    HGB 16.6 09/17/2018    HCT 46.0 09/17/2018    MCV 87 09/17/2018     09/17/2018    No results found for: BNP  No results found for: BNP         50 minutes were spent face to face in this visit with more than 50% spent discussing diagnoses as listed above, counseling, and coordination of care.      Anisha Adamson, [facility] Heart [facility]   Electrophysiology      This note has been dictated using voice recognition software. Any grammatical, typographical, or context distortions are unintentional and inherent to the software.

## 2021-06-26 NOTE — PROGRESS NOTES
Progress Notes by Dave Tamez MD at 4/20/2018  8:10 AM     Author: Dave Tamez MD Service: -- Author Type: Physician    Filed: 4/20/2018  8:36 AM Encounter Date: 4/20/2018 Status: Signed    : Dave Tamez MD (Physician)           Click to link to [facility] Heart [facility] HEART CARE NOTE    Thank you, Dr. Boateng, for asking us to see Conrad Carson at the [facility].      Assessment/Recommendations   Patient with known obstructive sleep apnea, hypertension, and paroxysmal atrial fibrillation with a recent episode of persistent atrial fibrillation.  He is feeling well after cardioversion.  We also talked about pulmonary vein isolation versus medical therapy versus watchful waiting.  Because this happens only every 4 years, he is not eager to undergo procedures at this point in time which seems quite reasonable.  He does have an appointment to check his CPAP mask next month.  He also will take his blood pressure 2 or 3 times a week and report back to us if it is more than rarely above 140 systolic.    We spoke today about getting back to an exercise routine which I strongly encouraged him to do.  I also encouraged him to join a club or find a way to exercise next winter.  We talked about diet and I advised that he reduce his carbohydrates by 50% and substitute protein.  This along with regular exercise will undoubtedly result in weight loss.  Our goal would bet be that he loses the 15 pounds over the next year.    Acute for allowing us to participate in his care.         History of Present Illness    Mr. Conrad Carson is a 67 y.o. male with known history of hypertension, obstructive sleep apnea, and paroxysmal atrial fibrillation.  About every 4 years she has had an episode of atrial fibrillation and recently went into [facility] was atrial fibrillation was cardioverted in the emergency department.  He did have a visit in our A. fib clinic and  was offered various treatments including pulmonary vein isolation but the infrequency of his episodes have made him not very eager for other procedures and this seems reasonable.    He admits to being very sedentary over the winter months and is picked up about 15 pounds.  He does not walk much at all.  They gave away the treadmill.  He plans on getting out to walk now that it is warm weather.    He denies chest discomfort, and feels like his rhythm is been quite steady since his cardioversion.  He is not taking his blood pressures at home.         Physical Examination Review of Systems   Vitals:    04/20/18 0808   BP: 142/86   Pulse: 60   Resp: 18     Body mass index is 33.3 kg/(m^2).  Wt Readings from Last 3 Encounters:   04/20/18 219 lb (99.3 kg)   03/09/18 218 lb (98.9 kg)   03/03/18 210 lb (95.3 kg)     General Appearance:   Alert, cooperative and in no acute distress.   ENT/Mouth: Oral mucuos membranes pink and moist .      EYES:  No scleral icterus. No Xanthelasma.    Neck: JVP normal. No Hepatojugular reflux. Thyroid not visualized   Chest/Lungs:   Lungs are clear to auscultation, equal chest wall expansion    Cardiovascular:   S1, S2 without murmur ,clicks or rubs. Brachial, radial and posterior tibial pulses are intact and symetric. No carotid bruits noted   Abdomen:  Nontender. BS+. No bruits.      Extremities: No cyanosis, clubbing and trace pretibial edema   Skin: no xanthelasma, warm.    Psych: Appropriate affect.   Neurologic: normal gait, normal  bilateral, no tremors        General: WNL  Eyes: WNL  Ears/Nose/Throat: WNL  Lungs: WNL  Heart: WNL  Stomach: WNL  Bladder: WNL  Muscle/Joints: WNL  Skin: WNL  Nervous System: WNL  Mental Health: WNL     Blood: WNL     Medical History  Surgical History Family History Social History   Past Medical History:   Diagnosis Date   ? Atrial fibrillation    ? HLD (hyperlipidemia)    ? HTN (hypertension)     Past Surgical History:   Procedure Laterality Date   ?  GANGLION CYST EXCISION Right     ring finger   ? [place] CARDIOVERSION ELECTIVE ARRHYTHMIA EXTERNAL      10/01/2009, 3/3/18   ? PROSTATECTOMY  12/2013   ? TONSILLECTOMY      Family History   Problem Relation Age of Onset   ? No Medical Problems Mother    ? Cancer Father    ? Dyslipidemia Father    ? Aneurysm Father      ruputre aortic aneruysm   ? No Medical Problems Sister    ? No Medical Problems Brother     Social History     Social History   ? Marital status:      Spouse name: N/A   ? Number of children: N/A   ? Years of education: N/A     Occupational History   ? Not on file.     Social History Main Topics   ? Smoking status: Never Smoker   ? Smokeless tobacco: Never Used   ? Alcohol use 1.2 oz/week     2 Shots of liquor per week   ? Drug use: No   ? Sexual activity: Not on file     Other Topics Concern   ? Not on file     Social History Narrative          Medications  Allergies   Current Outpatient Prescriptions   Medication Sig Dispense Refill   ? amLODIPine (NORVASC) 10 MG tablet TAKE 1 TABLET (10 MG TOTAL) BY MOUTH DAILY. 90 tablet 1   ? aspirin 325 MG tablet 325 mg daily.     ? atenolol (TENORMIN) 50 MG tablet Take 50 mg by mouth 2 (two) times a day.     ? b complex vitamins capsule 1 capsule daily.     ? cholecalciferol, vitamin D3, (VITAMIN D3) 1,000 unit capsule 1,000 Units 2 (two) times a day.     ? cimetidine (TAGAMET) 800 MG tablet 400 mg daily. Take as directed.     ? DOCOSAHEXANOIC ACID/EPA (FISH OIL ORAL) 1 capsule 2 (two) times a day. 1200 MG Capsule     ? gemfibrozil (LOPID) 600 MG tablet 600 mg 2 (two) times a day.     ? lisinopril-hydrochlorothiazide (PRINZIDE,ZESTORETIC) 20-12.5 mg per tablet 1 tablet daily.     ? multivitamin (MULTIVITAMIN) per tablet 1 tablet daily.     ? niacin 500 MG tablet 500 mg daily. Take as directed.     ? omeprazole (PRILOSEC) 20 MG capsule 20 mg daily.     ? simvastatin (ZOCOR) 20 MG tablet Take 20 mg by mouth.     ? tadalafil (CIALIS) 5 MG tablet Take 5 mg by  mouth daily as needed for erectile dysfunction.     ? VIAGRA 100 mg tablet AS DIRECTED       No current facility-administered medications for this visit.       No Known Allergies      Lab Results    Chemistry/lipid CBC Cardiac Enzymes/BNP/TSH/INR   Lab Results   Component Value Date    CHOL 176 10/10/2017    HDL 30 (L) 10/10/2017    LDLCALC 112 10/10/2017    TRIG 168 (H) 10/10/2017    CREATININE 1.06 03/03/2018    BUN 13 03/03/2018    K 3.7 03/03/2018     03/03/2018     (H) 03/03/2018    CO2 26 03/03/2018    Lab Results   Component Value Date    WBC 8.0 03/03/2018    HGB 16.4 03/03/2018    HCT 45.7 03/03/2018    MCV 87 03/03/2018     03/03/2018    Lab Results   Component Value Date    TROPONINI 0.01 03/03/2018    TSH 1.29 10/10/2017    INR 1.03 03/03/2018

## 2021-06-27 NOTE — PROGRESS NOTES
Progress Notes by Dave Tamez MD at 12/11/2018  2:10 PM     Author: Dave Tamez MD Service: -- Author Type: Physician    Filed: 12/11/2018  2:54 PM Encounter Date: 12/11/2018 Status: Signed    : Dave Tamez MD (Physician)           Click to link to [facility] HEART [facility] NOTE    Thank you, Dr. Boateng, for asking us to see Conrad Carson at the [facility].      Assessment/Recommendations   Patient with recurrent paroxysmal atrial fibrillation.  He is certainly a good candidate for pulmonary vein isolation and statistically would fall in the category of patients with the highest level of success.  We talked about this and discuss options of medical therapy versus pulmonary vein isolation procedure.  He has heard a lot of data and I think we will proceed with pulmonary vein isolation procedure if his insurance company will cover it.  He is checking that now.    I am not changing of his medications today.    Thank you for allowing us to participate in his care.         History of Present Illness    Mr. Conrad Carson is a 68 y.o. male with known paroxysmal atrial fibrillation and hypertension.  He had a repeat episode of atrial fibrillation and about a day later it converted back to sinus rhythm.  He has been seen in the A. fib clinic and pulmonary vein isolation procedure is been discussed and is tentatively planned, pending his insurance company's approval.  He is not had any recurrent episodes since his recent emergency room visit.  He denies shortness of breath, orthopnea, paroxysmal nocturnal dyspnea, peripheral edema, syncope or near syncopal episodes.  He  .     Physical Examination Review of Systems   Vitals:    12/11/18 1417   BP: 144/80   Pulse: 60   Resp: (!) 6     Body mass index is 32.84 kg/m .  Wt Readings from Last 3 Encounters:   12/11/18 216 lb (98 kg)   10/31/18 220 lb (99.8 kg)   09/20/18 219 lb (99.3 kg)     General  Appearance:   Alert, cooperative and in no acute distress.   ENT/Mouth: Oral mucuos membranes pink and moist .      EYES:  No scleral icterus. No Xanthelasma.    Neck: JVP normal. No Hepatojugular reflux. Thyroid not visualized   Chest/Lungs:   Lungs are clear to auscultation, equal chest wall expansion    Cardiovascular:   S1, S2 without murmur ,clicks or rubs. Brachial, radial and posterior tibial pulses are intact and symetric. No carotid bruits noted   Abdomen:  Nontender. BS+.  Rounded      Extremities: No cyanosis, clubbing or edema   Skin: no xanthelasma, warm.    Psych: Appropriate affect.   Neurologic: normal gait, normal  bilateral, no tremors        General: WNL  Eyes: WNL  Ears/Nose/Throat: WNL  Lungs: WNL  Heart: WNL  Stomach: WNL  Bladder: WNL  Muscle/Joints: WNL  Skin: WNL  Nervous System: WNL  Mental Health: WNL     Blood: WNL     Medical History  Surgical History Family History Social History   Past Medical History:   Diagnosis Date   ? Atrial fibrillation (H)    ? HLD (hyperlipidemia)    ? HTN (hypertension)    ? Prostate cancer (H)    ? Sleep apnea     Past Surgical History:   Procedure Laterality Date   ? GANGLION CYST EXCISION Right     ring finger   ? [place] CARDIOVERSION ELECTIVE ARRHYTHMIA EXTERNAL      10/01/2009, 3/3/18   ? PROSTATECTOMY  12/2013   ? TONSILLECTOMY      Family History   Problem Relation Age of Onset   ? No Medical Problems Mother    ? Cancer Father    ? Dyslipidemia Father    ? Aneurysm Father         ruputre aortic aneruysm   ? No Medical Problems Sister    ? No Medical Problems Brother     Social History     Socioeconomic History   ? Marital status:      Spouse name: Not on file   ? Number of children: Not on file   ? Years of education: Not on file   ? Highest education level: Not on file   Social Needs   ? Financial resource strain: Not on file   ? Food insecurity - worry: Not on file   ? Food insecurity - inability: Not on file   ? Transportation needs - medical:  Not on file   ? Transportation needs - non-medical: Not on file   Occupational History   ? Not on file   Tobacco Use   ? Smoking status: Never Smoker   ? Smokeless tobacco: Never Used   Substance and Sexual Activity   ? Alcohol use: Yes     Alcohol/week: 1.2 oz     Types: 2 Shots of liquor per week   ? Drug use: No   ? Sexual activity: Not on file   Other Topics Concern   ? Not on file   Social History Narrative   ? Not on file          Medications  Allergies   Current Outpatient Medications   Medication Sig Dispense Refill   ? apixaban (ELIQUIS) 5 mg Tab tablet Take 1 tablet (5 mg total) by mouth 2 (two) times a day. 180 tablet 4   ? atenolol (TENORMIN) 50 MG tablet Take 50 mg by mouth 2 (two) times a day.     ? b complex vitamins capsule 1 capsule daily.     ? cholecalciferol, vitamin D3, (VITAMIN D3) 1,000 unit capsule 1,000 Units 2 (two) times a day.     ? cimetidine (TAGAMET) 800 MG tablet 400 mg daily. Take as directed.     ? diltiazem (CARDIZEM CD) 180 MG 24 hr capsule Take 1 capsule (180 mg total) by mouth daily. 90 capsule 3   ? DOCOSAHEXANOIC ACID/EPA (FISH OIL ORAL) 1 capsule 2 (two) times a day. 1200 MG Capsule     ? gemfibrozil (LOPID) 600 MG tablet 600 mg 2 (two) times a day.     ? ketoconazole (NIZORAL) 2 % cream      ? lisinopril-hydrochlorothiazide (PRINZIDE,ZESTORETIC) 20-12.5 mg per tablet 1 tablet daily.     ? multivitamin (MULTIVITAMIN) per tablet 1 tablet daily.     ? niacin 500 MG tablet 500 mg daily. Take as directed.     ? omeprazole (PRILOSEC) 20 MG capsule 20 mg daily.     ? simvastatin (ZOCOR) 20 MG tablet Take 20 mg by mouth.     ? tadalafil (CIALIS) 5 MG tablet Take 5 mg by mouth daily as needed for erectile dysfunction.     ? VIAGRA 100 mg tablet AS DIRECTED       No current facility-administered medications for this visit.       No Known Allergies      Lab Results    Chemistry/lipid CBC Cardiac Enzymes/BNP/TSH/INR   Lab Results   Component Value Date    CHOL 176 10/10/2017    HDL 30  (L) 10/10/2017    LDLCALC 112 10/10/2017    TRIG 168 (H) 10/10/2017    CREATININE 1.15 09/17/2018    BUN 15 09/17/2018    K 3.6 09/17/2018     09/17/2018     (H) 09/17/2018    CO2 25 09/17/2018    Lab Results   Component Value Date    WBC 8.2 09/17/2018    HGB 16.6 09/17/2018    HCT 46.0 09/17/2018    MCV 87 09/17/2018     09/17/2018    Lab Results   Component Value Date    TROPONINI <0.01 09/17/2018    TSH 1.29 10/10/2017    INR 1.03 03/03/2018

## 2021-06-27 NOTE — PROGRESS NOTES
Progress Notes by Anisha Adamson CNP at 7/1/2019  7:50 AM     Author: Anisha Adamson CNP Service: -- Author Type: Nurse Practitioner    Filed: 7/1/2019  8:31 AM Encounter Date: 7/1/2019 Status: Signed    : Anisha Adamson CNP (Nurse Practitioner)           Click to link to [facility] Heart [facility] HEART [facility] ELECTROPHYSIOLOGY NOTE      Assessment/Recommendations   Assessment/Plan:    1.  Paroxysmal atrial fibrillation: No symptomatology or evidence of recurrence of A. fib off membrane active antiarrhythmic drug therapy.  He has a [id number] score of 2 for age 65-74 and hypertension and HAS-BLED score of 1 for age >65.  Continue Eliquis 5 mg twice daily for stroke prophylaxis.  He denies any side effects, bleeding issues, or missed doses.    2.  Hypertension: Blood pressure elevated again today.  Continue atenolol 50 mg twice daily and lisinopril-hydrochlorothiazide to 20-25 mg daily.      3.  Obstructive sleep apnea: Consistent use of CPAP nightly.      Follow-up with Dr. Lu 1 year post PVI.     History of Present Illness    Mr. Conrad Carson is a very pleasant 68 y.o. male who comes in today for EP follow-up of atrial fibrillation.  He has a history of paroxysmal atrial fibrillation since 2009.  Symptoms consist of a fluttering heartbeat and an indigestion type sensation that goes up to the base of his throat.  He is immediately aware of going into and out of A. fib.  He has undergone early cardioversion on several occasions, other episodes spontaneously converted back to sinus rhythm.  His longest episode lasted approximately 28 hours in September 2018.  He underwent pulmonary vein isolation ablation with Dr. Lu on 3/27/2019 with cryo-to all 4 pulmonary veins and a right atrial CTI line.  He was noted to have normal voltage throughout the left atrium.  He remains on Eliquis 5 mg twice daily for stroke prophylaxis.  He also has a history of hypertension, hyperlipidemia, and sleep apnea  for which he wears CPAP nightly and has reevaluated on an annual basis.    Kev states that he continues to feel very well.  He has not had any episodes of A. fib.  He denies chest discomfort, palpitations, abdominal bloating/fullness or peripheral edema, shortness of breath, paroxysmal nocturnal dyspnea, orthopnea, lightheadedness, dizziness, pre-syncope, or syncope.      Cardiographics (personally reviewed):  EKG, Done 3/13/2019 shows sinus rhythm at 59 bpm, QT/QTc interval measures 414/409 ms  EKG 9/17/2018 shows atrial fibrillation with rapid ventricular response 133 bpm    Event monitor worn 5/15/2019 through 5/29/2019 shows sinus rhythm with an average rate of 60 bpm with a range of 50 to 75 bpm.  Only 2 baseline transmissions received.  No atrial fibrillation.  States he had no symptoms while wearing the monitor.    Echo done 2/25/2019:  1. Normal left ventricular size and systolic performance with a visually estimated ejection fraction of 65%.   2. No significant valvular heart disease is identified on this study.   3. Normal right ventricular size and systolic performance.   4. There is mild left atrial enlargement.     CT pulmonary veins done 3/4/2019: 5 pulmonary veins.    Pulmonary Vein: Left superior pulmonary vein: 20.1 x 14.9mm, area 246sq cm. Left inferior pulmonary vein: 21.4 x 14.3mm, area 257sq cm. Right superior pulmonary vein: 21.7 x 22.8mm, area 389sq cm. Right middle pulmonary vein: 13.3 x 17.6mm, area 177sq cm. Right inferior pulmonary vein: 22 x 21,2mm, area 380sq cm. The esophagus appears to be adjacent to the left atrial body/posterior side of right inferior vein..    Very mild plaque suggested in the proximal circumflex. No significant plaque observed in the remainder of the coronary vessels.    No significant incidental extracardiac findings.       Physical Examination Review of Systems   Vitals:    07/01/19 0751   BP: 124/82   Pulse: 60   Resp: 20     Body mass index is 32.75  kg/m .  Wt Readings from Last 3 Encounters:   07/01/19 215 lb 6.4 oz (97.7 kg)   05/08/19 217 lb (98.4 kg)   04/01/19 215 lb 1.6 oz (97.6 kg)       General Appearance:   Alert, well-appearing and in no acute distress.   HEENT: Atraumatic, normocephalic.  No scleral icterus, normal conjunctivae, EOM intact, PERRL; mucous membranes pink and moist.     Chest: Chest symmetric, spine straight.   Lungs:   Respirations unlabored: Lungs are clear to auscultation.   Cardiovascular:   Normal first and second heart sounds with no murmurs, rubs, or gallops.  Regular rate and rhythm.  Radial and posterior tibial pulses are intact, no edema.   Abdomen:  Soft, nondistended, bowel sounds present   Extremities: No cyanosis or clubbing   Musculoskeletal: Moves all extremities   Skin: Warm, dry, intact.    Neurologic: Mood and affect are appropriate, alert and oriented to person, place, time, and situation    General: WNL  Eyes: WNL  Ears/Nose/Throat: WNL  Lungs: WNL  Heart: WNL  Stomach: WNL  Bladder: WNL  Muscle/Joints: WNL  Skin: WNL  Nervous System: WNL  Mental Health: WNL     Blood: WNL     Medical History  Surgical History Family History Social History   Past Medical History:   Diagnosis Date   ? Atrial fibrillation (H)    ? HLD (hyperlipidemia)    ? HTN (hypertension)    ? Prostate cancer (H)    ? Sleep apnea    ? Status post catheter ablation of atrial fibrillation 3/27/2019    PVI Mar 27, 2019 (cryo-PVI + RA-CTI line)    Past Surgical History:   Procedure Laterality Date   ? EP ABLATION AFLUTTER  3/27/2019    Procedure: EP Ablation Atrial Flutter;  Surgeon: Mayur Lu MD;  Location: [facility];  Service: Cardiology   ? EP ABLATION PVI Left 3/27/2019    Procedure: EP Ablation PVI;  Surgeon: Mayur Lu MD;  Location: [facility];  Service: Cardiology   ? GANGLION CYST EXCISION Right     ring finger   ? [place] CARDIOVERSION ELECTIVE ARRHYTHMIA EXTERNAL      10/01/2009, 3/3/18   ? PROSTATECTOMY  12/2013    ? TONSILLECTOMY      Family History   Problem Relation Age of Onset   ? No Medical Problems Mother    ? Cancer Father    ? Dyslipidemia Father    ? Aneurysm Father         ruputre aortic aneruysm   ? No Medical Problems Sister    ? No Medical Problems Brother     Social History     Socioeconomic History   ? Marital status:      Spouse name: Not on file   ? Number of children: Not on file   ? Years of education: Not on file   ? Highest education level: Not on file   Occupational History   ? Not on file   Social Needs   ? Financial resource strain: Not on file   ? Food insecurity:     Worry: Not on file     Inability: Not on file   ? Transportation needs:     Medical: Not on file     Non-medical: Not on file   Tobacco Use   ? Smoking status: Never Smoker   ? Smokeless tobacco: Never Used   Substance and Sexual Activity   ? Alcohol use: Yes     Alcohol/week: 1.2 oz     Types: 2 Shots of liquor per week   ? Drug use: No   ? Sexual activity: Not on file   Lifestyle   ? Physical activity:     Days per week: Not on file     Minutes per session: Not on file   ? Stress: Not on file   Relationships   ? Social connections:     Talks on phone: Not on file     Gets together: Not on file     Attends [employer] service: Not on file     Active member of club or organization: Not on file     Attends meetings of clubs or organizations: Not on file     Relationship status: Not on file   ? Intimate partner violence:     Fear of current or ex partner: Not on file     Emotionally abused: Not on file     Physically abused: Not on file     Forced sexual activity: Not on file   Other Topics Concern   ? Not on file   Social History Narrative   ? Not on file          Medications  Allergies   Current Outpatient Medications   Medication Sig Dispense Refill   ? apixaban (ELIQUIS) 5 mg Tab tablet Take 1 tablet (5 mg total) by mouth 2 (two) times a day. 180 tablet 4   ? atenolol (TENORMIN) 50 MG tablet Take 50 mg by mouth 2 (two) times a  day.     ? b complex vitamins capsule 1 capsule daily.     ? cholecalciferol, vitamin D3, (VITAMIN D3) 1,000 unit capsule 1,000 Units 2 (two) times a day.     ? cimetidine (TAGAMET) 800 MG tablet 400 mg daily. Take as directed.     ? DOCOSAHEXANOIC ACID/EPA (FISH OIL ORAL) 1 capsule 2 (two) times a day. 1200 MG Capsule     ? gemfibrozil (LOPID) 600 MG tablet 600 mg 2 (two) times a day.     ? lisinopril-hydrochlorothiazide (PRINZIDE,ZESTORETIC) 20-25 mg per tablet Take 1 tablet by mouth daily. 90 tablet 3   ? miscellaneous medical supply ([employer] [facility] [place] for [place] use at pressure: 9, nasal mask with cushion x 1,     ? multivitamin (MULTIVITAMIN) per tablet 1 tablet daily.     ? niacin 500 MG tablet 500 mg daily. Take as directed.     ? omeprazole (PRILOSEC) 20 MG capsule 20 mg daily.     ? simvastatin (ZOCOR) 20 MG tablet Take 20 mg by mouth daily.            ? VIAGRA 100 mg tablet AS DIRECTED       No current facility-administered medications for this visit.       No Known Allergies        Medical, surgical, family, social history, and medications were all reviewed and updated as necessary.   Lab Results    Chemistry CBC Other   Lab Results   Component Value Date    CREATININE 1.22 05/15/2019    BUN 16 05/15/2019     05/15/2019    K 4.2 05/15/2019     05/15/2019    CO2 28 05/15/2019     Creatinine (mg/dL)   Date Value   05/15/2019 1.22   03/27/2019 1.08   03/18/2019 1.05   03/13/2019 1.17    Lab Results   Component Value Date    WBC 6.2 03/27/2019    HGB 14.5 03/27/2019    HCT 42.2 03/27/2019    MCV 89 03/27/2019     03/27/2019    Lab Results   Component Value Date    TSH 1.47 02/14/2019                Anisha Adamson, [facility] Heart [facility]   Electrophysiology      This note has been dictated using voice recognition software. Any grammatical, typographical, or context distortions are unintentional and inherent to the software.

## 2021-06-27 NOTE — PROGRESS NOTES
Progress Notes by Anisha Adamson CNP at 3/13/2019  2:50 PM     Author: Anisha Adamson CNP Service: -- Author Type: Nurse Practitioner    Filed: 3/13/2019  4:32 PM Encounter Date: 3/13/2019 Status: Signed    : Anisha Adamson CNP (Nurse Practitioner)           Click to link to [facility] Heart [facility] HEART [facility] ELECTROPHYSIOLOGY NOTE      Assessment/Recommendations   Assessment/Plan:    1.  Paroxysmal atrial fibrillation: Initially diagnosed in 2009.  Symptoms consist of palpitations and an indigestion type sensation that goes up to the base of his throat.  He is immediately aware of onset and termination of episodes.  He would prefer to avoid long-term medications.   He is scheduled for pulmonary vein isolation ablation with Dr. Lu on 3/18/2019.  We reviewed the ablation procedure, <1% risk for complication, expected recovery, and follow-up.  He was not started on famotidine as he is already on omeprazole.    He has a [id number] score of 2 for age 65-74 and hypertension and HAS-BLED score of 1 for age >65.  He is on Eliquis 5 mg twice daily for stroke prophylaxis.  He missed 2 doses in the past week, which is very unusual for him.  Usually this necessitates a transesophageal echo prior to ablation to rule out left atrial thrombus.  He is very symptomatic with A. fib and is certain he has not had any episodes of A. fib since September 2018.  This will be reviewed with Dr. Lu for final determination regarding OBI.    2.  Hypertension: Blood pressure elevated today, but he was nervous for this appointment.  His blood pressure has been consistently within target, so no medication adjustments recommended at this time.  Continue atenolol 50 mg twice daily.    3.  Obstructive sleep apnea: Consistent use of CPAP nightly.  He was instructed to bring this to the hospital to use after ablation.    Post ablation check on 3/21/2019.  Follow-up with me 6 weeks post PVI.     History of Present Illness   "  Mr. Conrad Carson is a very pleasant 68 y.o. male who comes in today for EP follow-up of atrial fibrillation and history and physical prior to all [place] vein isolation ablation.  He has a history of paroxysmal atrial fibrillation since 2009.  Symptoms consist of a fluttering heartbeat and an indigestion type sensation that goes up to the base of his throat.  He is immediately aware of going into and out of A. fib.  He has undergone early cardioversion on several occasions, other episodes spontaneously converted back to sinus rhythm.  Episodes were very sporadic, but he has had 2 this year, last on 9/17/2018.  On that occasion, he went to the emergency room for urgent cardioversion, but they felt that \"the risks for cardioversion outweighed the risks of medical management\".  He was started on Eliquis 5 mg twice daily for stroke prophylaxis and diltiazem for additional rate control.  He reports the episode spontaneously terminated several hours later, but the episode lasted in total approximately 28 hours.  He believes that additional stress at work as a trigger for this episode.  He also has a history of hypertension, hyperlipidemia, and sleep apnea for which he wears CPAP nightly and has reevaluated on an annual basis.    Kev states that he has been feeling well.  He has not had any further episodes of A. fib.  He denies chest discomfort, palpitations, abdominal bloating/fullness or peripheral edema, shortness of breath, paroxysmal nocturnal dyspnea, orthopnea, lightheadedness, dizziness, pre-syncope, or syncope.  He continues on Eliquis for stroke prophylaxis, but states that he has missed 2 doses in the last week which was highly unusual for him.    Cardiographics (personally reviewed):  EKG, Done 3/13/2019 shows sinus rhythm at 59 bpm, QT/QTc interval measures 414/409 ms  EKG 9/17/2018 shows atrial fibrillation with rapid ventricular response 133 bpm    Echo done 2/25/2019:  1. Normal left ventricular " size and systolic performance with a visually estimated ejection fraction of 65%.   2. No significant valvular heart disease is identified on this study.   3. Normal right ventricular size and systolic performance.   4. There is mild left atrial enlargement.     CT pulmonary veins done 3/4/2019: 5 pulmonary veins.    Pulmonary Vein: Left superior pulmonary vein: 20.1 x 14.9mm, area 246sq cm. Left inferior pulmonary vein: 21.4 x 14.3mm, area 257sq cm. Right superior pulmonary vein: 21.7 x 22.8mm, area 389sq cm. Right middle pulmonary vein: 13.3 x 17.6mm, area 177sq cm. Right inferior pulmonary vein: 22 x 21,2mm, area 380sq cm. The esophagus appears to be adjacent to the left atrial body/posterior side of right inferior vein..    Very mild plaque suggested in the proximal circumflex. No significant plaque observed in the remainder of the coronary vessels.    No significant incidental extracardiac findings.       Physical Examination Review of Systems   Vitals:    03/13/19 1445   BP: 170/90   Pulse: 68   Resp: 16     Body mass index is 32.84 kg/m .  Wt Readings from Last 3 Encounters:   03/13/19 216 lb (98 kg)   03/04/19 216 lb (98 kg)   02/25/19 216 lb (98 kg)       General Appearance:   Alert, well-appearing and in no acute distress.   HEENT: Atraumatic, normocephalic.  No scleral icterus, normal conjunctivae, EOM intact, PERRL; mucous membranes pink and moist.     Chest: Chest symmetric, spine straight.   Lungs:   Respirations unlabored: Lungs are clear to auscultation.   Cardiovascular:   Normal first and second heart sounds with no murmurs, rubs, or gallops.  Regular rate and rhythm.  Radial and posterior tibial pulses are intact, no edema.   Abdomen:  Soft, nontender, nondistended, bowel sounds present   Extremities: No cyanosis or clubbing   Musculoskeletal: Moves all extremities   Skin: Warm, dry, intact.    Neurologic: Mood and affect are appropriate, alert and oriented to person, place, time, and situation     General: WNL  Eyes: WNL  Ears/Nose/Throat: WNL  Lungs: WNL  Heart: WNL  Stomach: WNL  Bladder: WNL  Muscle/Joints: WNL  Skin: WNL  Nervous System: WNL  Mental Health: WNL     Blood: WNL     Medical History  Surgical History Family History Social History   Past Medical History:   Diagnosis Date   ? Atrial fibrillation (H)    ? HLD (hyperlipidemia)    ? HTN (hypertension)    ? Prostate cancer (H)    ? Sleep apnea     Past Surgical History:   Procedure Laterality Date   ? GANGLION CYST EXCISION Right     ring finger   ? [place] CARDIOVERSION ELECTIVE ARRHYTHMIA EXTERNAL      10/01/2009, 3/3/18   ? PROSTATECTOMY  12/2013   ? TONSILLECTOMY      Family History   Problem Relation Age of Onset   ? No Medical Problems Mother    ? Cancer Father    ? Dyslipidemia Father    ? Aneurysm Father         ruputre aortic aneruysm   ? No Medical Problems Sister    ? No Medical Problems Brother     Social History     Socioeconomic History   ? Marital status:      Spouse name: Not on file   ? Number of children: Not on file   ? Years of education: Not on file   ? Highest education level: Not on file   Occupational History   ? Not on file   Social Needs   ? Financial resource strain: Not on file   ? Food insecurity:     Worry: Not on file     Inability: Not on file   ? Transportation needs:     Medical: Not on file     Non-medical: Not on file   Tobacco Use   ? Smoking status: Never Smoker   ? Smokeless tobacco: Never Used   Substance and Sexual Activity   ? Alcohol use: Yes     Alcohol/week: 1.2 oz     Types: 2 Shots of liquor per week   ? Drug use: No   ? Sexual activity: Not on file   Lifestyle   ? Physical activity:     Days per week: Not on file     Minutes per session: Not on file   ? Stress: Not on file   Relationships   ? Social connections:     Talks on phone: Not on file     Gets together: Not on file     Attends [employer] service: Not on file     Active member of club or organization: Not on file     Attends meetings of clubs  or organizations: Not on file     Relationship status: Not on file   ? Intimate partner violence:     Fear of current or ex partner: Not on file     Emotionally abused: Not on file     Physically abused: Not on file     Forced sexual activity: Not on file   Other Topics Concern   ? Not on file   Social History Narrative   ? Not on file          Medications  Allergies   Current Outpatient Medications   Medication Sig Dispense Refill   ? apixaban (ELIQUIS) 5 mg Tab tablet Take 1 tablet (5 mg total) by mouth 2 (two) times a day. 180 tablet 4   ? atenolol (TENORMIN) 50 MG tablet Take 50 mg by mouth 2 (two) times a day.     ? b complex vitamins capsule 1 capsule daily.     ? cholecalciferol, vitamin D3, (VITAMIN D3) 1,000 unit capsule 1,000 Units 2 (two) times a day.     ? cimetidine (TAGAMET) 800 MG tablet 400 mg daily. Take as directed.     ? diltiazem (CARDIZEM CD) 180 MG 24 hr capsule Take 1 capsule (180 mg total) by mouth daily. 90 capsule 3   ? DOCOSAHEXANOIC ACID/EPA (FISH OIL ORAL) 1 capsule 2 (two) times a day. 1200 MG Capsule     ? gemfibrozil (LOPID) 600 MG tablet 600 mg 2 (two) times a day.     ? lisinopril-hydrochlorothiazide (PRINZIDE,ZESTORETIC) 20-12.5 mg per tablet 1 tablet daily.     ? multivitamin (MULTIVITAMIN) per tablet 1 tablet daily.     ? niacin 500 MG tablet 500 mg daily. Take as directed.     ? omeprazole (PRILOSEC) 20 MG capsule 20 mg daily.     ? simvastatin (ZOCOR) 20 MG tablet Take 20 mg by mouth.     ? tadalafil (CIALIS) 5 MG tablet Take 5 mg by mouth daily as needed for erectile dysfunction.     ? VIAGRA 100 mg tablet AS DIRECTED       No current facility-administered medications for this visit.       No Known Allergies     Questionable previous reaction to anesthesia after wisdom tooth removal in his 30s, but no anesthesia related issues with more recent prostatectomy.   Medical, surgical, family, social history, and medications were all reviewed and updated as necessary.   Lab Results     Chemistry CBC Other   Lab Results   Component Value Date    CREATININE 1.17 03/13/2019    BUN 15 03/13/2019     03/13/2019    K 3.8 03/13/2019     03/13/2019    CO2 27 03/13/2019     Creatinine (mg/dL)   Date Value   03/13/2019 1.17   02/14/2019 1.12   09/17/2018 1.15   03/03/2018 1.06    Lab Results   Component Value Date    WBC 7.7 03/13/2019    HGB 14.4 03/13/2019    HCT 42.5 03/13/2019    MCV 89 03/13/2019     03/13/2019    Lab Results   Component Value Date    TSH 1.47 02/14/2019            50 minutes were spent face to face in this visit with more than 50% spent discussing diagnoses as listed above, counseling, and coordination of care.      Anisha Adamson, [facility]   Electrophysiology      This note has been dictated using voice recognition software. Any grammatical, typographical, or context distortions are unintentional and inherent to the software.

## 2021-06-27 NOTE — PROGRESS NOTES
Progress Notes by Anisha Adamson CNP at 5/8/2019  7:50 AM     Author: Anisha Adamson CNP Service: -- Author Type: Nurse Practitioner    Filed: 5/8/2019  8:33 AM Encounter Date: 5/8/2019 Status: Signed    : Anisha Adamson CNP (Nurse Practitioner)           Click to link to [facility] HEART [facility] ELECTROPHYSIOLOGY NOTE      Assessment/Recommendations   Assessment/Plan:    1.  Paroxysmal atrial fibrillation: No symptomatology or evidence of recurrence of A. fib since on 3/18/2019.  He remains off membrane active antiarrhythmic drug therapy.  He has had an uneventful recovery from ablation with no emergency department or unscheduled clinic visits.  Discontinue diltiazem.  He will wear a 2-week event monitor for evaluation of silent A. fib and was instructed to press the button to record any symptoms he may have while wearing the monitor.    He has a [id number] score of 2 for age 65-74 and hypertension and HAS-BLED score of 1 for age >65.  Continue Eliquis 5 mg twice daily for stroke prophylaxis.  He denies any side effects, bleeding issues, or missed doses.    2.  Hypertension: Blood pressure elevated again today.  Continue atenolol 50 mg twice daily.  Increase lisinopril-hydrochlorothiazide to 20-25 mg daily.  BMP in 1 week.    3.  Obstructive sleep apnea: Consistent use of CPAP nightly.      Follow-up 3 months post PVI.     History of Present Illness    Mr. Conrad Carson is a very pleasant 68 y.o. male who comes in today for EP follow-up of atrial fibrillation.  He has a history of paroxysmal atrial fibrillation since 2009.  Symptoms consist of a fluttering heartbeat and an indigestion type sensation that goes up to the base of his throat.  He is immediately aware of going into and out of A. fib.  He has undergone early cardioversion on several occasions, other episodes spontaneously converted back to sinus rhythm.  His longest episode lasted approximately 28 hours in September  2018.  He underwent pulmonary vein isolation ablation with Dr. Lu on 3/27/2019 with cryo-to all 4 pulmonary veins and a right atrial CTI line.  He was noted to have normal voltage throughout the left atrium.  He remains on Eliquis 5 mg twice daily for stroke prophylaxis.  He also has a history of hypertension, hyperlipidemia, and sleep apnea for which he wears CPAP nightly and has reevaluated on an annual basis.    Kev states that he has been feeling very well.  He has had an uneventful recovery from ablation and has not had any episodes of A. fib.  He denies heartburn, difficulty swallowing, or groin site issues.  He denies chest discomfort, palpitations, abdominal bloating/fullness or peripheral edema, shortness of breath, paroxysmal nocturnal dyspnea, orthopnea, lightheadedness, dizziness, pre-syncope, or syncope.      Cardiographics (personally reviewed):  EKG, Done 3/13/2019 shows sinus rhythm at 59 bpm, QT/QTc interval measures 414/409 ms  EKG 9/17/2018 shows atrial fibrillation with rapid ventricular response 133 bpm    Echo done 2/25/2019:  1. Normal left ventricular size and systolic performance with a visually estimated ejection fraction of 65%.   2. No significant valvular heart disease is identified on this study.   3. Normal right ventricular size and systolic performance.   4. There is mild left atrial enlargement.     CT pulmonary veins done 3/4/2019: 5 pulmonary veins.    Pulmonary Vein: Left superior pulmonary vein: 20.1 x 14.9mm, area 246sq cm. Left inferior pulmonary vein: 21.4 x 14.3mm, area 257sq cm. Right superior pulmonary vein: 21.7 x 22.8mm, area 389sq cm. Right middle pulmonary vein: 13.3 x 17.6mm, area 177sq cm. Right inferior pulmonary vein: 22 x 21,2mm, area 380sq cm. The esophagus appears to be adjacent to the left atrial body/posterior side of right inferior vein..    Very mild plaque suggested in the proximal circumflex. No significant plaque observed in the remainder of the  coronary vessels.    No significant incidental extracardiac findings.       Physical Examination Review of Systems   Vitals:    05/08/19 0752   BP: 144/90   Pulse:    Resp:      Body mass index is 32.99 kg/m .  Wt Readings from Last 3 Encounters:   05/08/19 217 lb (98.4 kg)   04/01/19 215 lb 1.6 oz (97.6 kg)   03/28/19 213 lb 3 oz (96.7 kg)       General Appearance:   Alert, well-appearing and in no acute distress.   HEENT: Atraumatic, normocephalic.  No scleral icterus, normal conjunctivae, EOM intact, PERRL; mucous membranes pink and moist.     Chest: Chest symmetric, spine straight.   Lungs:   Respirations unlabored: Lungs are clear to auscultation.   Cardiovascular:   Normal first and second heart sounds with no murmurs, rubs, or gallops.  Regular rate and rhythm.  Radial and posterior tibial pulses are intact, no edema.   Abdomen:  Soft, nontender, nondistended, bowel sounds present   Extremities: No cyanosis or clubbing   Musculoskeletal: Moves all extremities   Skin: Warm, dry, intact.    Neurologic: Mood and affect are appropriate, alert and oriented to person, place, time, and situation    General: WNL  Eyes: WNL  Ears/Nose/Throat: WNL  Lungs: WNL  Heart: WNL  Stomach: WNL  Bladder: WNL  Muscle/Joints: WNL  Skin: WNL  Nervous System: WNL  Mental Health: WNL     Blood: WNL     Medical History  Surgical History Family History Social History   Past Medical History:   Diagnosis Date   ? Atrial fibrillation (H)    ? HLD (hyperlipidemia)    ? HTN (hypertension)    ? Prostate cancer (H)    ? Sleep apnea    ? Status post catheter ablation of atrial fibrillation 3/27/2019    PVI Mar 27, 2019 (cryo-PVI + RA-CTI line)    Past Surgical History:   Procedure Laterality Date   ? EP ABLATION AFLUTTER  3/27/2019    Procedure: EP Ablation Atrial Flutter;  Surgeon: Mayur Lu MD;  Location: [facility] Cath Lab;  Service: Cardiology   ? EP ABLATION PVI Left 3/27/2019    Procedure: EP Ablation PVI;  Surgeon: Aly  Mayur MORLEY MD;  Location: [facility] Cath Lab;  Service: Cardiology   ? GANGLION CYST EXCISION Right     ring finger   ? [facility] CARDIOVERSION ELECTIVE ARRHYTHMIA EXTERNAL      10/01/2009, 3/3/18   ? PROSTATECTOMY  12/2013   ? TONSILLECTOMY      Family History   Problem Relation Age of Onset   ? No Medical Problems Mother    ? Cancer Father    ? Dyslipidemia Father    ? Aneurysm Father         ruputre aortic aneruysm   ? No Medical Problems Sister    ? No Medical Problems Brother     Social History     Socioeconomic History   ? Marital status:      Spouse name: Not on file   ? Number of children: Not on file   ? Years of education: Not on file   ? Highest education level: Not on file   Occupational History   ? Not on file   Social Needs   ? Financial resource strain: Not on file   ? Food insecurity:     Worry: Not on file     Inability: Not on file   ? Transportation needs:     Medical: Not on file     Non-medical: Not on file   Tobacco Use   ? Smoking status: Never Smoker   ? Smokeless tobacco: Never Used   Substance and Sexual Activity   ? Alcohol use: Yes     Alcohol/week: 1.2 oz     Types: 2 Shots of liquor per week   ? Drug use: No   ? Sexual activity: Not on file   Lifestyle   ? Physical activity:     Days per week: Not on file     Minutes per session: Not on file   ? Stress: Not on file   Relationships   ? Social connections:     Talks on phone: Not on file     Gets together: Not on file     Attends Jainism service: Not on file     Active member of club or organization: Not on file     Attends meetings of clubs or organizations: Not on file     Relationship status: Not on file   ? Intimate partner violence:     Fear of current or ex partner: Not on file     Emotionally abused: Not on file     Physically abused: Not on file     Forced sexual activity: Not on file   Other Topics Concern   ? Not on file   Social History Narrative   ? Not on file          Medications  Allergies   Current Outpatient  Medications   Medication Sig Dispense Refill   ? apixaban (ELIQUIS) 5 mg Tab tablet Take 1 tablet (5 mg total) by mouth 2 (two) times a day. 180 tablet 4   ? atenolol (TENORMIN) 50 MG tablet Take 50 mg by mouth 2 (two) times a day.     ? b complex vitamins capsule 1 capsule daily.     ? cholecalciferol, vitamin D3, (VITAMIN D3) 1,000 unit capsule 1,000 Units 2 (two) times a day.     ? cimetidine (TAGAMET) 800 MG tablet 400 mg daily. Take as directed.     ? DOCOSAHEXANOIC ACID/EPA (FISH OIL ORAL) 1 capsule 2 (two) times a day. 1200 MG Capsule     ? gemfibrozil (LOPID) 600 MG tablet 600 mg 2 (two) times a day.     ? multivitamin (MULTIVITAMIN) per tablet 1 tablet daily.     ? niacin 500 MG tablet 500 mg daily. Take as directed.     ? omeprazole (PRILOSEC) 20 MG capsule 20 mg daily.     ? simvastatin (ZOCOR) 20 MG tablet Take 20 mg by mouth daily.            ? lisinopril-hydrochlorothiazide (PRINZIDE,ZESTORETIC) 20-25 mg per tablet Take 1 tablet by mouth daily. 90 tablet 3   ? VIAGRA 100 mg tablet AS DIRECTED       No current facility-administered medications for this visit.       No Known Allergies     Questionable previous reaction to anesthesia after wisdom tooth removal in his 30s, but no anesthesia related issues with more recent prostatectomy.   Medical, surgical, family, social history, and medications were all reviewed and updated as necessary.   Lab Results    Chemistry CBC Other   Lab Results   Component Value Date    CREATININE 1.08 03/27/2019    BUN 13 03/27/2019     03/27/2019    K 3.8 03/27/2019     03/27/2019    CO2 24 03/27/2019     Creatinine (mg/dL)   Date Value   03/27/2019 1.08   03/18/2019 1.05   03/13/2019 1.17   02/14/2019 1.12    Lab Results   Component Value Date    WBC 6.2 03/27/2019    HGB 14.5 03/27/2019    HCT 42.2 03/27/2019    MCV 89 03/27/2019     03/27/2019    Lab Results   Component Value Date    TSH 1.47 02/14/2019                Anisha Adamson, [facility]  [facility]   Electrophysiology      This note has been dictated using voice recognition software. Any grammatical, typographical, or context distortions are unintentional and inherent to the software.

## 2021-06-29 NOTE — PROGRESS NOTES
"Progress Notes by Mayur Lu MD at 6/16/2020  4:10 PM     Author: Mayur Lu MD Service: -- Author Type: Physician    Filed: 6/16/2020  4:41 PM Encounter Date: 6/16/2020 Status: Signed    : Mayur Lu MD (Physician)           The patient has been notified of following:     \"This video visit will be conducted via a call between you and your physician/provider. We have found that certain health care needs can be provided without the need for an in-person physical exam.  This service lets us provide the care you need with a video conversation.  If a prescription is necessary we can send it directly to your pharmacy.  If lab work is needed we can place an order for that and you can then stop by our lab to have the test done at a later time.      Patient has given verbal consent to a Video visit? Yes    HEART CARE VIDEO ENCOUNTER        The patient has chosen to have the visit conducted as a video visit, to reduce risk of exposure given the current status of Coronavirus in our community. This video visit is being conducted via a call between the patient and physician/provider. Health care needs are being provided without a physical exam.     Assessment/Recommendations   Assessment:    Paroxysmal atrial fibrillation: The patient is 1 year out from his ablation procedure to treat his atrial fibrillation.  He continues to do well and has had no recurrent symptoms suggesting atrial fibrillation or flutter.  He has had no ECG documented recurrence of atrial fibrillation or flutter.  The patient has a elevated [id number] score and remains on oral anticoagulant therapy (Eliquis).  The patient is tolerating that medication without any significant side effects.    Obstructive sleep apnea: The patient is compliant with his CPAP therapy.    Essential hypertension: The patient had some difficulties earlier this year with elevated blood pressure but now is normotensive with addition of amlodipine " therapy.    Plan:    No change in current medical therapy    24 hr Holter in 6-8 weeks    The patient has not seen his primary cardiologist, Dr. Dave Tamez in some time.  I will request that he be seen back by Dr. Tamez in approximately 9 months.    I have reviewed the note as documented.  This accurately captures the substance of my conversation with the patient.    Total time of video between patient and provider was 13 minutes   Start Time: 1617  Stop Time: 1629    Originating Location (pt. Location): Home    Distant Location (provider location):  [facility] HEART [facility]     Mode of Communication:  Video Conference via [email or web address]       History of Present Illness/Subjective    Conrad Carson is a 69 y.o. male who is being evaluated via a billable video visit and has consented to a video visit. Conrad Carson has a history of paroxysmal atrial fibrillation who underwent ablation approximately 1 year ago.  The patient reports that he continues to do well.  He reports no palpitations, lightheadedness, presyncope or syncope.  He has no symptoms to suggest return of atrial fibrillation or flutter.  He does report that he had difficulties with his blood pressure earlier in the year.  His primary care physician Dr. Jennie Hernandez reintroduced amlodipine and normalized his blood pressure at a dose of 10 mg daily.  The patient reports that he also switched statin therapy from simvastatin to atorvastatin.  He reports no exertional dyspnea or chest pain.  He is not having any orthopnea, PND or ankle edema.      I have reviewed and updated the patient's Past Medical History, Social History, Family History and Medication List.     Physical Examination performed via live video encounter Review of Systems   General Appearance:   no distress, normal body habitus, upright.   ENT/Mouth: membranes moist, no nasal discharge or bleeding gums.  Normal head shape, no evidence of injury or laceration.     EYES:  no scleral  icterus, normal conjunctivae   Neck: no evidence of thyromegaly.  Supple   Chest/Lungs:   No audible wheezing equal chest wall expansion. Non labored breathing.  No cough.   Cardiovascular:   No evidence of elevated jugular venous pressure.  No evidence of pitting edema bilaterally    Abdomen:  no evidence of abdominal distention. No observe juandice.     Extremities: no cyanosis or clubbing noted.    Skin: no xanthelasma, normal skin collar. No evidence of facial lacerations.      Neurologic: Normal arm motion bilateral, no tremors.  No evidence of focal defect.       Psychiatric: alert and oriented x3, calm                                               Medical History  Surgical History Family History Social History   Past Medical History:   Diagnosis Date   ? Atrial fibrillation (H)    ? HLD (hyperlipidemia)    ? HTN (hypertension)    ? Prostate cancer (H)    ? Sleep apnea    ? Status post catheter ablation of atrial fibrillation 3/27/2019    PVI Mar 27, 2019 (cryo-PVI + RA-CTI line)    Past Surgical History:   Procedure Laterality Date   ? EP ABLATION AFLUTTER  3/27/2019    Procedure: EP Ablation Atrial Flutter;  Surgeon: Mayur Lu MD;  Location: [facility] Cath Lab;  Service: Cardiology   ? EP ABLATION PVI Left 3/27/2019    Procedure: EP Ablation PVI;  Surgeon: Mayur Lu MD;  Location: [facility] Cath Lab;  Service: Cardiology   ? GANGLION CYST EXCISION Right     ring finger   ? [place] CARDIOVERSION ELECTIVE ARRHYTHMIA EXTERNAL      10/01/2009, 3/3/18   ? PROSTATECTOMY  12/2013   ? TONSILLECTOMY      Family History   Problem Relation Age of Onset   ? No Medical Problems Mother    ? Cancer Father    ? Dyslipidemia Father    ? Aneurysm Father         ruputre aortic aneruysm   ? No Medical Problems Sister    ? No Medical Problems Brother       Social History     Socioeconomic History   ? Marital status:      Spouse name: Not on file   ? Number of children: Not on file   ? Years of education: Not  on file   ? Highest education level: Not on file   Occupational History   ? Not on file   Social Needs   ? Financial resource strain: Not on file   ? Food insecurity     Worry: Not on file     Inability: Not on file   ? Transportation needs     Medical: Not on file     Non-medical: Not on file   Tobacco Use   ? Smoking status: Never Smoker   ? Smokeless tobacco: Never Used   Substance and Sexual Activity   ? Alcohol use: Yes     Alcohol/week: 2.0 standard drinks     Types: 2 Shots of liquor per week   ? Drug use: No   ? Sexual activity: Not on file   Lifestyle   ? Physical activity     Days per week: Not on file     Minutes per session: Not on file   ? Stress: Not on file   Relationships   ? Social connections     Talks on phone: Not on file     Gets together: Not on file     Attends Evangelical service: Not on file     Active member of club or organization: Not on file     Attends meetings of clubs or organizations: Not on file     Relationship status: Not on file   ? Intimate partner violence     Fear of current or ex partner: Not on file     Emotionally abused: Not on file     Physically abused: Not on file     Forced sexual activity: Not on file   Other Topics Concern   ? Not on file   Social History Narrative   ? Not on file          Medications  Allergies   Current Outpatient Medications   Medication Sig Dispense Refill   ? amLODIPine (NORVASC) 10 MG tablet Take 10 mg by mouth daily.     ? atenolol (TENORMIN) 50 MG tablet Take 50 mg by mouth 2 (two) times a day.     ? atorvastatin (LIPITOR) 40 MG tablet Take 40 mg by mouth daily.     ? b complex vitamins capsule 1 capsule daily.     ? cholecalciferol, vitamin D3, (VITAMIN D3) 1,000 unit capsule 1,000 Units 2 (two) times a day.     ? cimetidine (TAGAMET) 800 MG tablet 400 mg daily. Take as directed.     ? DOCOSAHEXANOIC ACID/EPA (FISH OIL ORAL) 1 capsule 2 (two) times a day. 1200 MG Capsule     ? ELIQUIS 5 mg Tab tablet TAKE 1 TABLET BY MOUTH TWICE A   tablet 1   ? gemfibrozil (LOPID) 600 MG tablet 600 mg 2 (two) times a day.     ? lisinopriL-hydrochlorothiazide (PRINZIDE,ZESTORETIC) 20-25 mg per tablet TAKE 1 TABLET BY MOUTH EVERY DAY 90 tablet 3   ? miscellaneous medical supply ([employer]) [facility] CPAP machine for home use at pressure: 9, nasal mask with cushion x 1,     ? multivitamin (MULTIVITAMIN) per tablet 1 tablet daily.     ? niacin 500 MG tablet 500 mg daily. Take as directed.     ? omeprazole (PRILOSEC) 20 MG capsule 20 mg daily.     ? VIAGRA 100 mg tablet AS DIRECTED       No current facility-administered medications for this visit.     No Known Allergies      Lab Results    Chemistry/lipid CBC Cardiac Enzymes/BNP/TSH/INR   Lab Results   Component Value Date    CHOL 226 (H) 04/14/2020    HDL 35 (L) 04/14/2020    LDLCALC 164 (H) 04/14/2020    TRIG 133 04/14/2020    CREATININE 1.09 04/14/2020    BUN 13 04/14/2020    K 4.0 04/14/2020     04/14/2020     04/14/2020    CO2 27 04/14/2020    Lab Results   Component Value Date    WBC 6.2 03/27/2019    HGB 14.5 03/27/2019    HCT 42.2 03/27/2019    MCV 89 03/27/2019     03/27/2019    Lab Results   Component Value Date    TROPONINI <0.01 09/17/2018    TSH 1.47 02/14/2019    INR 1.03 03/03/2018        Mayur Lu

## 2021-06-30 NOTE — PROGRESS NOTES
Progress Notes by Anisha Adamson CNP at 1/18/2021  7:50 AM     Author: Anisha Adamson CNP Service: -- Author Type: Nurse Practitioner    Filed: 1/18/2021  8:43 AM Encounter Date: 1/18/2021 Status: Signed    : Anisha Adamson CNP (Nurse Practitioner)             Assessment/Recommendations   Assessment/Plan:    1.  Paroxysmal atrial fibrillation: Likely episode of A. fib on 1/11/2021 that lasted approximately 1 day. Possibly triggered by combination of dehydration and stress. He remains off membrane active antiarrhythmic drug therapy.   We discussed recurrence of A. fib post ablation and treatment options of medications versus repeat ablation. We also discussed the importance of documenting the arrhythmia with either EKG or use of something like the Mobi Rider edie. At this point, he has had only one episode, so recommend watchful waiting.  He has some mild, but asymptomatic sinus bradycardia.  Continue atenolol 50 mg twice daily.    He was reassured that atrial fibrillation is not life-threatening, but carries an increased risk for stroke. He has a [id number] score of 2 for age 65-74 and hypertension and HAS-BLED score of 1 for age >65.  Continue Eliquis 5 mg twice daily for stroke prophylaxis.  He denies any side effects, bleeding issues, or missed doses.     2.  Hypertension: Blood pressure at target today.  Continue atenolol, amlodipine, and lisinopril-hydrochlorothiazide.       3.  Obstructive sleep apnea: Consistent use of CPAP nightly.   He states this has been working well. We reviewed the correlation between sleep apnea and A. fib.    Follow up in 1 year       History of Present Illness/Subjective    Mr. Conrad Carson is a 70 y.o. male who comes in today for EP follow-up of atrial fibrillation.  He has a history of atrial fibrillation, hypertension, hyperlipidemia, and obstructive sleep apnea for which he wears CPAP nightly.  He has a history of paroxysmal atrial fibrillation since 2009.   Symptoms consist of a fluttering heartbeat and an indigestion type sensation that goes up to the base of his throat.  He is immediately aware of going into and out of A. fib.  He has undergone early cardioversion on several occasions, other episodes spontaneously converted back to sinus rhythm.  His longest episode lasted approximately 28 hours in September 2018.  He underwent pulmonary vein isolation ablation with Dr. Lu on 3/27/2019 with cryo-to all 4 pulmonary veins and a right atrial CTI line.  He was noted to have normal voltage throughout the left atrium.  He remains on Eliquis 5 mg twice daily for stroke prophylaxis.      Kev states that he continues to feel very well.  He had an episode of A. fib on 1/11/2021 associated with an irregular pulse and his typical sensation of flutters.  He denies tachycardia or any other associated symptoms. States it was possibly triggered by some dehydration and stress.  He denies chest discomfort, recurrent palpitations, abdominal bloating/fullness or peripheral edema, shortness of breath, paroxysmal nocturnal dyspnea, orthopnea, lightheadedness, dizziness, pre-syncope, or syncope.       Cardiographics (EKGs personally reviewed):  EKG done 12/7/2020 shows sinus bradycardia at 55 bpm, QT/QTc interval measures 416/408 ms  EKG 9/17/2018 shows atrial fibrillation with rapid ventricular response 133 bpm    24-hour Holter monitor worn 6/19/2019 shows sinus rhythm with rates ranging from 40 to 103 bpm.  First-degree AV block.  Rare PACs or PVCs.  No significant bradycardia or pauses.  No atrial fibrillation or flutter.  No significant ventricular ectopy.     Event monitor worn 5/15/2019 through 5/29/2019 shows sinus rhythm with an average rate of 60 bpm with a range of 50 to 75 bpm.  Only 2 baseline transmissions received.  No atrial fibrillation.  States he had no symptoms while wearing the monitor.     Echo done 2/25/2019:  1. Normal left ventricular size and systolic  performance with a visually estimated ejection fraction of 65%.   2. No significant valvular heart disease is identified on this study.   3. Normal right ventricular size and systolic performance.   4. There is mild left atrial enlargement.      CT pulmonary veins done 3/4/2019: 5 pulmonary veins.    Pulmonary Vein: Left superior pulmonary vein: 20.1 x 14.9mm, area 246sq cm. Left inferior pulmonary vein: 21.4 x 14.3mm, area 257sq cm. Right superior pulmonary vein: 21.7 x 22.8mm, area 389sq cm. Right middle pulmonary vein: 13.3 x 17.6mm, area 177sq cm. Right inferior pulmonary vein: 22 x 21,2mm, area 380sq cm. The esophagus appears to be adjacent to the left atrial body/posterior side of right inferior vein..    Very mild plaque suggested in the proximal circumflex. No significant plaque observed in the remainder of the coronary vessels.    No significant incidental extracardiac findings.    I have reviewed and updated the patient's Past Medical History, Social History, Family History and Medication List.     Physical Examination Review of Systems   Vitals:    01/18/21 0803   BP: 126/78   Pulse: (!) 58   Resp: 16   SpO2: 98%     Body mass index is 33.3 kg/m .  Wt Readings from Last 3 Encounters:   01/18/21 219 lb (99.3 kg)   06/16/20 214 lb (97.1 kg)   07/01/19 215 lb 6.4 oz (97.7 kg)       General Appearance:   Alert, well-appearing and in no acute distress.   HEENT: Atraumatic, normocephalic.  No scleral icterus, normal conjunctivae, EOMs intact, PERRL. Wearing a mask.   Chest/Lungs:   Chest symmetric, spine straight.  Respirations unlabored.  Lungs are clear to auscultation.   Cardiovascular:   Regular rate and rhythm.  Normal first and second heart sounds with no murmurs, rubs, or gallops; radial and posterior tibial pulses are intact, No JVD, no edema.   Abdomen:  Soft, nondistended, bowel sounds present.   Extremities: No cyanosis or clubbing.   Musculoskeletal: Moves all extremities.  Gait steady.   Skin:  Warm, dry, intact.    Neurologic: Mood and affect are appropriate.  Alert and oriented to person, place, time, and situation.    General: WNL  Eyes: WNL  Ears/Nose/Throat: WNL  Lungs: WNL  Heart: Irregular Heartbeat  Stomach: WNL  Bladder: WNL  Muscle/Joints: WNL  Skin: WNL  Nervous System: WNL  Mental Health: WNL     Blood: WNL       Medical History  Surgical History Family History Social History   Past Medical History:   Diagnosis Date   ? Atrial fibrillation (H)    ? HLD (hyperlipidemia)    ? HTN (hypertension)    ? Prostate cancer (H)    ? Sleep apnea    ? Status post catheter ablation of atrial fibrillation 3/27/2019    PVI Mar 27, 2019 (cryo-PVI + RA-CTI line)    Past Surgical History:   Procedure Laterality Date   ? EP ABLATION AFLUTTER  3/27/2019    Procedure: EP Ablation Atrial Flutter;  Surgeon: Mayur Lu MD;  Location: [facility] Lab;  Service: Cardiology   ? EP ABLATION PVI Left 3/27/2019    Procedure: EP Ablation PVI;  Surgeon: Mayur Lu MD;  Location: [facility] Lab;  Service: Cardiology   ? GANGLION CYST EXCISION Right     ring finger   ? [place] CARDIOVERSION ELECTIVE ARRHYTHMIA EXTERNAL      10/01/2009, 3/3/18   ? PROSTATECTOMY  12/2013   ? TONSILLECTOMY     ? UMBILICAL HERNIA REPAIR      Family History   Problem Relation Age of Onset   ? No Medical Problems Mother    ? Cancer Father    ? Dyslipidemia Father    ? Aneurysm Father         ruputre aortic aneruysm   ? No Medical Problems Sister    ? No Medical Problems Brother     Social History     Tobacco Use   ? Smoking status: Never Smoker   ? Smokeless tobacco: Never Used   Substance Use Topics   ? Alcohol use: Yes     Alcohol/week: 2.0 standard drinks     Types: 2 Shots of liquor per week   ? Drug use: No          Medications  Allergies   Current Outpatient Medications   Medication Sig Dispense Refill   ? amLODIPine (NORVASC) 10 MG tablet Take 10 mg by mouth daily.     ? atenolol (TENORMIN) 50 MG tablet Take 50 mg by mouth 2  (two) times a day.     ? atorvastatin (LIPITOR) 40 MG tablet Take 40 mg by mouth daily.     ? b complex vitamins capsule 1 capsule daily.     ? cholecalciferol, vitamin D3, (VITAMIN D3) 1,000 unit capsule 1,000 Units 2 (two) times a day.     ? cimetidine (TAGAMET) 800 MG tablet 400 mg daily. Take as directed.     ? DOCOSAHEXANOIC ACID/EPA (FISH OIL ORAL) 1 capsule 2 (two) times a day. 1200 MG Capsule     ? ELIQUIS 5 mg Tab tablet TAKE 1 TABLET BY MOUTH TWICE A  tablet 1   ? gemfibrozil (LOPID) 600 MG tablet 600 mg 2 (two) times a day.     ? lisinopriL-hydrochlorothiazide (PRINZIDE,ZESTORETIC) 20-25 mg per tablet TAKE 1 TABLET BY MOUTH EVERY DAY 90 tablet 3   ? miscellaneous medical supply ([employer] SHARPS WALL CABINET) [facility] CPAP machine for home use at pressure: 9, nasal mask with cushion x 1,     ? multivitamin (MULTIVITAMIN) per tablet 1 tablet daily.     ? niacin 500 MG tablet 500 mg daily. Take as directed.     ? omeprazole (PRILOSEC) 20 MG capsule 20 mg daily.     ? VIAGRA 100 mg tablet AS DIRECTED       No current facility-administered medications for this visit.     No Known Allergies      Lab Results    Chemistry/lipid CBC Other   Lab Results   Component Value Date    CREATININE 1.24 12/07/2020    BUN 17 12/07/2020     12/07/2020    K 4.2 12/07/2020     12/07/2020    CO2 28 12/07/2020     Creatinine (mg/dL)   Date Value   12/07/2020 1.24   04/14/2020 1.09   05/15/2019 1.22   03/27/2019 1.08    Lab Results   Component Value Date    WBC 8.4 12/07/2020    HGB 15.4 12/07/2020    HCT 43.8 12/07/2020    MCV 90 12/07/2020     12/07/2020    Lab Results   Component Value Date    TROPONINI <0.01 09/17/2018    TSH 1.47 02/14/2019     Lab Results   Component Value Date    INR 1.03 03/03/2018    INR 1.23 (H) 12/19/2013    INR 1.03 10/03/2009        This note has been dictated using voice recognition software. Any grammatical, typographical, or context distortions are unintentional and inherent  to the software.

## 2021-07-04 NOTE — LETTER
Letter by Dave Tamez MD at      Author: Dave Tamez MD Service: -- Author Type: --    Filed:  Encounter Date: 6/4/2021 Status: (Other)         Conrad Carson  [address] [place] [address]      June 4, 2021      Dear Conrad,    This letter is to remind you that you are due for your follow up appointment with Dr. Dave Tamez. To help ensure you are in the best health possible, a regular follow-up with your cardiologist is essential.     Please call our Patient Scheduling Line at [phone] to schedule your appointment at your earliest convenience.  If you have recently scheduled an appointment, please disregard this letter.    We look forward to seeing you again. As always, we are available at the number  above for any questions or concerns you may have.      Sincerely,     The Physicians and Staff of [facility]

## 2021-07-15 ENCOUNTER — OFFICE VISIT (OUTPATIENT)
Dept: CARDIOLOGY | Facility: CLINIC | Age: 71
End: 2021-07-15
Payer: COMMERCIAL

## 2021-07-15 VITALS
HEART RATE: 60 BPM | RESPIRATION RATE: 16 BRPM | BODY MASS INDEX: 33.04 KG/M2 | SYSTOLIC BLOOD PRESSURE: 122 MMHG | WEIGHT: 218 LBS | DIASTOLIC BLOOD PRESSURE: 70 MMHG | HEIGHT: 68 IN

## 2021-07-15 DIAGNOSIS — I48.0 PAROXYSMAL ATRIAL FIBRILLATION (H): ICD-10-CM

## 2021-07-15 DIAGNOSIS — E78.5 HYPERLIPIDEMIA LDL GOAL <100: Primary | ICD-10-CM

## 2021-07-15 DIAGNOSIS — Z98.890 STATUS POST CATHETER ABLATION OF ATRIAL FIBRILLATION: ICD-10-CM

## 2021-07-15 DIAGNOSIS — I10 ESSENTIAL HYPERTENSION: ICD-10-CM

## 2021-07-15 DIAGNOSIS — G47.30 SLEEP APNEA, UNSPECIFIED TYPE: ICD-10-CM

## 2021-07-15 PROCEDURE — 99213 OFFICE O/P EST LOW 20 MIN: CPT | Performed by: INTERNAL MEDICINE

## 2021-07-15 RX ORDER — CLOTRIMAZOLE AND BETAMETHASONE DIPROPIONATE 10; .64 MG/G; MG/G
CREAM TOPICAL
COMMUNITY
Start: 2021-01-25 | End: 2024-09-19 | Stop reason: ALTCHOICE

## 2021-07-15 ASSESSMENT — MIFFLIN-ST. JEOR: SCORE: 1718.34

## 2021-07-15 NOTE — LETTER
"7/15/2021    Jennie Hernandez MD  [address] [facility] [address]    RE: Conrad Carson       Dear Colleague,    I had the pleasure of seeing Conrad Carson in the [facility].            Assessment/Recommendations   Patient with known hypertension as well as paroxysmal atrial fibrillation, status post pulmonary vein isolation procedure.  He had one episode of atrial fibrillation after his ablation but otherwise has done very well.  Blood pressures been well controlled.  He walks on average about 4 times a week and have asked him to get up to 5-6 times a week.  Not changing his medications.    We will plan on seeing him back in 1 year, but of course would be happy to see him sooner if questions or problems arise.    Thank you for allowing us to participate in his care.       History of Present Illness/Subjective    Mr. Conrad Carson is a 70 year old male with known history of paroxysmal atrial fibrillation, hypertension, sleep apnea.  He is been doing well.  After pulmonary vein isolation procedure he has had one episode of atrial fibrillation which he believes lasted about 1 year.  He is not had any unusual shortness of breath with activity, and uses his CPAP regularly so does not have orthopnea or paroxysmal nocturnal dyspnea.  He has not had any syncopal or near syncopal episodes and denies any chest discomfort.           Physical Examination Review of Systems   /70 (BP Location: Left arm, Patient Position: Sitting, Cuff Size: Adult Regular)   Pulse 60   Resp 16   Ht 1.727 m (5' 8\")   Wt 98.9 kg (218 lb)   BMI 33.15 kg/m    Body mass index is 33.15 kg/m .  Wt Readings from Last 3 Encounters:   07/15/21 98.9 kg (218 lb)   01/18/21 99.3 kg (219 lb)   06/16/20 97.1 kg (214 lb)     General Appearance:   Alert, cooperative and in no acute distress.   ENT/Mouth: Patient wearing a mask.      EYES:  no scleral icterus, normal " "conjunctivae   Neck: JVP normal. No Hepatojugular reflux. Thyroid not visualized.   Chest/Lungs:   Lungs are clear to auscultation, equal chest wall expansion.   Cardiovascular:   S1, S2 without murmur ,clicks or rubs. Brachial, radial and posterior tibial pulses are intact and symetric. No carotid bruits noted   Abdomen:  Nontender. BS+.  Rounded.  Horizontal scar above umbilicus..   Extremities: No cyanosis, clubbing and mild pretibial edema   Skin: no xanthelasma, warm.    Neurologic: normal arm movement bilateral, no tremors     Psychiatric: Appropriate affect.      Enc Vitals  BP: 122/70  Pulse: 60  Resp: 16  Weight: 98.9 kg (218 lb) (With Shoes)  Height: 172.7 cm (5' 8\")                                           Medical History  Surgical History Family History Social History   Past Medical History:   Diagnosis Date     Atrial fibrillation (H)      HLD (hyperlipidemia)      HTN (hypertension)      Prostate cancer (H)      Sleep apnea      Status post catheter ablation of atrial fibrillation 3/27/2019    PVI Mar 27, 2019 (cryo-PVI + RA-CTI line)    Past Surgical History:   Procedure Laterality Date     EP ABLATION AFLUTTER  3/27/2019    Procedure: EP Ablation Atrial Flutter;  Surgeon: Mayur Lu MD;  Location: [facility];  Service: Cardiology     EP ABLATION PVI Left 3/27/2019    Procedure: EP Ablation PVI;  Surgeon: Mayur Lu MD;  Location: [facility];  Service: Cardiology     EXCISE GANGLION WRIST Right     ring finger     HERNIA REPAIR, UMBILICAL       [place] CARDIOVERSION ELECTIVE ARRHYTHMIA EXTERNAL      10/01/2009, 3/3/18     PROSTATECTOMY  12/2013     TONSILLECTOMY      Family History   Problem Relation Age of Onset     No Known Problems Mother      Cancer Father      Dyslipidemia Father      Aneurysm Father         ruputre aortic aneruysm     No Known Problems Sister      No Known Problems Brother     Social History     Socioeconomic History     Marital status:      " Spouse name: Not on file     Number of children: Not on file     Years of education: Not on file     Highest education level: Not on file   Occupational History     Not on file   Tobacco Use     Smoking status: Never Smoker     Smokeless tobacco: Never Used   Substance and Sexual Activity     Alcohol use: Yes     Alcohol/week: 2.0 standard drinks     Drug use: No     Sexual activity: Not on file   Other Topics Concern     Not on file   Social History Narrative     Not on file     Social Determinants of Health     Financial Resource Strain:      Difficulty of Paying Living Expenses:    Food Insecurity:      Worried About Running Out of Food in the Last Year:      Ran Out of Food in the Last Year:    Transportation Needs:      Lack of Transportation (Medical):      Lack of Transportation (Non-Medical):    Physical Activity:      Days of Exercise per Week:      Minutes of Exercise per Session:    Stress:      Feeling of Stress :    Social Connections:      Frequency of Communication with Friends and Family:      Frequency of Social Gatherings with Friends and Family:      Attends Alevism Services:      Active Member of Clubs or Organizations:      Attends Club or Organization Meetings:      Marital Status:    Intimate Partner Violence:      Fear of Current or Ex-Partner:      Emotionally Abused:      Physically Abused:      Sexually Abused:           Medications  Allergies   Current Outpatient Medications   Medication Sig Dispense Refill     amLODIPine (NORVASC) 10 MG tablet [AMLODIPINE (NORVASC) 10 MG TABLET] Take 10 mg by mouth daily.       atenolol (TENORMIN) 50 MG tablet [ATENOLOL (TENORMIN) 50 MG TABLET] Take 50 mg by mouth 2 (two) times a day.       atorvastatin (LIPITOR) 40 MG tablet [ATORVASTATIN (LIPITOR) 40 MG TABLET] Take 40 mg by mouth daily.       b complex vitamins capsule [B COMPLEX VITAMINS CAPSULE] 1 capsule daily.       cholecalciferol, vitamin D3, (VITAMIN D3) 1,000 unit capsule [CHOLECALCIFEROL,  VITAMIN D3, (VITAMIN D3) 1,000 UNIT CAPSULE] 1,000 Units 2 (two) times a day.       cimetidine (TAGAMET) 800 MG tablet [CIMETIDINE (TAGAMET) 800 MG TABLET] 400 mg daily. Take as directed.       clotrimazole-betamethasone (LOTRISONE) 1-0.05 % external cream APPLY A THIN LAYER TOPICALLY TWICE DAILY       DOCOSAHEXANOIC ACID/EPA (FISH OIL ORAL) [DOCOSAHEXANOIC ACID/EPA (FISH OIL ORAL)] 1 capsule 2 (two) times a day. 1200 MG Capsule       ELIQUIS 5 mg Tab tablet [ELIQUIS 5 MG TAB TABLET] TAKE 1 TABLET BY MOUTH TWICE A  tablet 1     gemfibrozil (LOPID) 600 MG tablet [GEMFIBROZIL (LOPID) 600 MG TABLET] 600 mg 2 (two) times a day.       lisinopriL-hydrochlorothiazide (PRINZIDE,ZESTORETIC) 20-25 mg per tablet [LISINOPRIL-HYDROCHLOROTHIAZIDE (PRINZIDE,ZESTORETIC) 20-25 MG PER TABLET] TAKE 1 TABLET BY MOUTH EVERY DAY 90 tablet 2     multivitamin (MULTIVITAMIN) per tablet [MULTIVITAMIN (MULTIVITAMIN) PER TABLET] 1 tablet daily.       niacin 500 MG tablet [NIACIN 500 MG TABLET] 500 mg daily. Take as directed.       omeprazole (PRILOSEC) 20 MG capsule [OMEPRAZOLE (PRILOSEC) 20 MG CAPSULE] 20 mg daily.       miscellaneous medical supply ([employer] SHARPS WALL CABINET) [facility] [MISCELLANEOUS MEDICAL SUPPLY ([employer] SHARPS WALL CABINET) [facility]] CPAP machine for home use at pressure: 9, nasal mask with cushion x 1,       VIAGRA 100 mg tablet [VIAGRA 100 MG TABLET] AS DIRECTED      No Known Allergies      Lab Results    Chemistry/lipid CBC Cardiac Enzymes/BNP/TSH/INR   Lab Results   Component Value Date    CHOL 153 01/15/2021    HDL 31 (L) 01/15/2021    TRIG 87 01/15/2021    BUN 17 12/07/2020     12/07/2020    CO2 28 12/07/2020    Lab Results   Component Value Date    WBC 8.4 12/07/2020    HGB 15.4 12/07/2020    HCT 43.8 12/07/2020    MCV 90 12/07/2020     12/07/2020    Lab Results   Component Value Date    TROPONINI <0.01 09/17/2018    TSH 1.47 02/14/2019    INR 1.03 03/03/2018                                                Thank you for allowing me to participate in the care of your patient.      Sincerely,     Dave Tamez MD     [facility]  cc:   No referring provider defined for this encounter.

## 2021-07-15 NOTE — PROGRESS NOTES
"        Assessment/Recommendations   Patient with known hypertension as well as paroxysmal atrial fibrillation, status post pulmonary vein isolation procedure.  He had one episode of atrial fibrillation after his ablation but otherwise has done very well.  Blood pressures been well controlled.  He walks on average about 4 times a week and have asked him to get up to 5-6 times a week.  Not changing his medications.    We will plan on seeing him back in 1 year, but of course would be happy to see him sooner if questions or problems arise.    Thank you for allowing us to participate in his care.       History of Present Illness/Subjective    Mr. Conrad Carson is a 70 year old male with known history of paroxysmal atrial fibrillation, hypertension, sleep apnea.  He is been doing well.  After pulmonary vein isolation procedure he has had one episode of atrial fibrillation which he believes lasted about 1 year.  He is not had any unusual shortness of breath with activity, and uses his CPAP regularly so does not have orthopnea or paroxysmal nocturnal dyspnea.  He has not had any syncopal or near syncopal episodes and denies any chest discomfort.           Physical Examination Review of Systems   /70 (BP Location: Left arm, Patient Position: Sitting, Cuff Size: Adult Regular)   Pulse 60   Resp 16   Ht 1.727 m (5' 8\")   Wt 98.9 kg (218 lb)   BMI 33.15 kg/m    Body mass index is 33.15 kg/m .  Wt Readings from Last 3 Encounters:   07/15/21 98.9 kg (218 lb)   01/18/21 99.3 kg (219 lb)   06/16/20 97.1 kg (214 lb)     General Appearance:   Alert, cooperative and in no acute distress.   ENT/Mouth: Patient wearing a mask.      EYES:  no scleral icterus, normal conjunctivae   Neck: JVP normal. No Hepatojugular reflux. Thyroid not visualized.   Chest/Lungs:   Lungs are clear to auscultation, equal chest wall expansion.   Cardiovascular:   S1, S2 without murmur ,clicks or rubs. Brachial, radial and posterior tibial " "pulses are intact and symetric. No carotid bruits noted   Abdomen:  Nontender. BS+.  Rounded.  Horizontal scar above umbilicus..   Extremities: No cyanosis, clubbing and mild pretibial edema   Skin: no xanthelasma, warm.    Neurologic: normal arm movement bilateral, no tremors     Psychiatric: Appropriate affect.      Enc Vitals  BP: 122/70  Pulse: 60  Resp: 16  Weight: 98.9 kg (218 lb) (With Shoes)  Height: 172.7 cm (5' 8\")                                           Medical History  Surgical History Family History Social History   Past Medical History:   Diagnosis Date     Atrial fibrillation (H)      HLD (hyperlipidemia)      HTN (hypertension)      Prostate cancer (H)      Sleep apnea      Status post catheter ablation of atrial fibrillation 3/27/2019    PVI Mar 27, 2019 (cryo-PVI + RA-CTI line)    Past Surgical History:   Procedure Laterality Date     EP ABLATION AFLUTTER  3/27/2019    Procedure: EP Ablation Atrial Flutter;  Surgeon: Mayur Lu MD;  Location: [facility];  Service: Cardiology     EP ABLATION PVI Left 3/27/2019    Procedure: EP Ablation PVI;  Surgeon: Mayur Lu MD;  Location: [facility];  Service: Cardiology     EXCISE GANGLION WRIST Right     ring finger     HERNIA REPAIR, UMBILICAL       [place] CARDIOVERSION ELECTIVE ARRHYTHMIA EXTERNAL      10/01/2009, 3/3/18     PROSTATECTOMY  12/2013     TONSILLECTOMY      Family History   Problem Relation Age of Onset     No Known Problems Mother      Cancer Father      Dyslipidemia Father      Aneurysm Father         ruputre aortic aneruysm     No Known Problems Sister      No Known Problems Brother     Social History     Socioeconomic History     Marital status:      Spouse name: Not on file     Number of children: Not on file     Years of education: Not on file     Highest education level: Not on file   Occupational History     Not on file   Tobacco Use     Smoking status: Never Smoker     Smokeless tobacco: Never Used "   Substance and Sexual Activity     Alcohol use: Yes     Alcohol/week: 2.0 standard drinks     Drug use: No     Sexual activity: Not on file   Other Topics Concern     Not on file   Social History Narrative     Not on file     Social Determinants of Health     Financial Resource Strain:      Difficulty of Paying Living Expenses:    Food Insecurity:      Worried About Running Out of Food in the Last Year:      Ran Out of Food in the Last Year:    Transportation Needs:      Lack of Transportation (Medical):      Lack of Transportation (Non-Medical):    Physical Activity:      Days of Exercise per Week:      Minutes of Exercise per Session:    Stress:      Feeling of Stress :    Social Connections:      Frequency of Communication with Friends and Family:      Frequency of Social Gatherings with Friends and Family:      Attends [employer] Services:      Active Member of Clubs or Organizations:      Attends Club or Organization Meetings:      Marital Status:    Intimate Partner Violence:      Fear of Current or Ex-Partner:      Emotionally Abused:      Physically Abused:      Sexually Abused:           Medications  Allergies   Current Outpatient Medications   Medication Sig Dispense Refill     amLODIPine (NORVASC) 10 MG tablet [AMLODIPINE (NORVASC) 10 MG TABLET] Take 10 mg by mouth daily.       atenolol (TENORMIN) 50 MG tablet [ATENOLOL (TENORMIN) 50 MG TABLET] Take 50 mg by mouth 2 (two) times a day.       atorvastatin (LIPITOR) 40 MG tablet [ATORVASTATIN (LIPITOR) 40 MG TABLET] Take 40 mg by mouth daily.       b complex vitamins capsule [B COMPLEX VITAMINS CAPSULE] 1 capsule daily.       cholecalciferol, vitamin D3, (VITAMIN D3) 1,000 unit capsule [CHOLECALCIFEROL, VITAMIN D3, (VITAMIN D3) 1,000 UNIT CAPSULE] 1,000 Units 2 (two) times a day.       cimetidine (TAGAMET) 800 MG tablet [CIMETIDINE (TAGAMET) 800 MG TABLET] 400 mg daily. Take as directed.       clotrimazole-betamethasone (LOTRISONE) 1-0.05 % external cream  APPLY A THIN LAYER TOPICALLY TWICE DAILY       DOCOSAHEXANOIC ACID/EPA (FISH OIL ORAL) [DOCOSAHEXANOIC ACID/EPA (FISH OIL ORAL)] 1 capsule 2 (two) times a day. 1200 MG Capsule       ELIQUIS 5 mg Tab tablet [ELIQUIS 5 MG TAB TABLET] TAKE 1 TABLET BY MOUTH TWICE A  tablet 1     gemfibrozil (LOPID) 600 MG tablet [GEMFIBROZIL (LOPID) 600 MG TABLET] 600 mg 2 (two) times a day.       lisinopriL-hydrochlorothiazide (PRINZIDE,ZESTORETIC) 20-25 mg per tablet [LISINOPRIL-HYDROCHLOROTHIAZIDE (PRINZIDE,ZESTORETIC) 20-25 MG PER TABLET] TAKE 1 TABLET BY MOUTH EVERY DAY 90 tablet 2     multivitamin (MULTIVITAMIN) per tablet [MULTIVITAMIN (MULTIVITAMIN) PER TABLET] 1 tablet daily.       niacin 500 MG tablet [NIACIN 500 MG TABLET] 500 mg daily. Take as directed.       omeprazole (PRILOSEC) 20 MG capsule [OMEPRAZOLE (PRILOSEC) 20 MG CAPSULE] 20 mg daily.       miscellaneous medical supply (MONOJECT SHARPS WALL CABINET) Misc [MISCELLANEOUS MEDICAL SUPPLY (MONOJECT SHARPS WALL CABINET) [facility]] CPAP machine for home use at pressure: 9, nasal mask with cushion x 1,       VIAGRA 100 mg tablet [VIAGRA 100 MG TABLET] AS DIRECTED      No Known Allergies      Lab Results    Chemistry/lipid CBC Cardiac Enzymes/BNP/TSH/INR   Lab Results   Component Value Date    CHOL 153 01/15/2021    HDL 31 (L) 01/15/2021    TRIG 87 01/15/2021    BUN 17 12/07/2020     12/07/2020    CO2 28 12/07/2020    Lab Results   Component Value Date    WBC 8.4 12/07/2020    HGB 15.4 12/07/2020    HCT 43.8 12/07/2020    MCV 90 12/07/2020     12/07/2020    Lab Results   Component Value Date    TROPONINI <0.01 09/17/2018    TSH 1.47 02/14/2019    INR 1.03 03/03/2018

## 2021-11-09 ENCOUNTER — TELEPHONE (OUTPATIENT)
Dept: CARDIOLOGY | Facility: CLINIC | Age: 71
End: 2021-11-09
Payer: COMMERCIAL

## 2021-11-09 NOTE — TELEPHONE ENCOUNTER
Anisha,     Patient with Chadvasc of 2 and Has Bled of 1 , currently maintained on eliquis 5mg BID will have insurance coverage changing after the first of the year.  He wanted your blessing on switching to Xarelto.    Ok to change to Xarelto?  Dose?    Thanks so much, patient will call when he's ready for an RX.    Emily

## 2021-11-09 NOTE — TELEPHONE ENCOUNTER
----- Message from Darling Aviles sent at 11/9/2021  8:48 AM CST -----  Regarding: TESFAYE cain  General phone call:    Caller: Kev  Primary cardiologist: TESFAYE  Detailed reason for call: Pt is calling and states that his insurance does not cover Eliquis anymore. Pt states that his insurance recommended Warfarin or Xeralto.. Pt states that he does not want to take Warfarin as there is too much maintenance with it. Pt prefers Xeralto but would like to discuss with a RN   New or active symptoms? N/A  Best phone number: [phone]  Best time to contact: anytime  Ok to leave a detailedmessage? yes  Device? no    Additional Info:

## 2021-12-14 ENCOUNTER — TELEPHONE (OUTPATIENT)
Dept: CARDIOLOGY | Facility: CLINIC | Age: 71
End: 2021-12-14
Payer: COMMERCIAL

## 2021-12-14 DIAGNOSIS — I48.0 PAROXYSMAL ATRIAL FIBRILLATION (H): Primary | ICD-10-CM

## 2021-12-14 NOTE — TELEPHONE ENCOUNTER
pc from patient, he is ready to transition to Xarelto.  Reviewed with pt reasoning for Xarelto, dose of medication, how to take/adminiser medication with frequency, most common potential side effects from the medication, when to contact the clinic with s/s, when to seek emergency medical care, RX would be sent to requested pharmacy and pt instructed to call if unable to obtain medication  12/14/2021 10:57 AM  Ashley Paez RN

## 2022-01-04 DIAGNOSIS — I10 ESSENTIAL HYPERTENSION: ICD-10-CM

## 2022-01-04 RX ORDER — LISINOPRIL AND HYDROCHLOROTHIAZIDE 20; 25 MG/1; MG/1
1 TABLET ORAL DAILY
Qty: 90 TABLET | Refills: 1 | Status: SHIPPED | OUTPATIENT
Start: 2022-01-04 | End: 2022-07-11

## 2022-01-26 ENCOUNTER — LAB REQUISITION (OUTPATIENT)
Dept: LAB | Facility: CLINIC | Age: 72
End: 2022-01-26

## 2022-01-26 DIAGNOSIS — I10 ESSENTIAL (PRIMARY) HYPERTENSION: ICD-10-CM

## 2022-01-26 DIAGNOSIS — E78.5 HYPERLIPIDEMIA, UNSPECIFIED: ICD-10-CM

## 2022-01-26 LAB
ALBUMIN SERPL-MCNC: 3.9 G/DL (ref 3.5–5)
ALP SERPL-CCNC: 81 U/L (ref 45–120)
ALT SERPL W P-5'-P-CCNC: 22 U/L (ref 0–45)
ANION GAP SERPL CALCULATED.3IONS-SCNC: 12 MMOL/L (ref 5–18)
AST SERPL W P-5'-P-CCNC: 19 U/L (ref 0–40)
BILIRUB SERPL-MCNC: 1.1 MG/DL (ref 0–1)
BUN SERPL-MCNC: 23 MG/DL (ref 8–28)
CALCIUM SERPL-MCNC: 9.8 MG/DL (ref 8.5–10.5)
CHLORIDE BLD-SCNC: 104 MMOL/L (ref 98–107)
CHOLEST SERPL-MCNC: 156 MG/DL
CO2 SERPL-SCNC: 25 MMOL/L (ref 22–31)
CREAT SERPL-MCNC: 1.45 MG/DL (ref 0.7–1.3)
GFR SERPL CREATININE-BSD FRML MDRD: 51 ML/MIN/1.73M2
GLUCOSE BLD-MCNC: 138 MG/DL (ref 70–125)
HDLC SERPL-MCNC: 30 MG/DL
LDLC SERPL CALC-MCNC: 96 MG/DL
POTASSIUM BLD-SCNC: 3.6 MMOL/L (ref 3.5–5)
PROT SERPL-MCNC: 7.3 G/DL (ref 6–8)
SODIUM SERPL-SCNC: 141 MMOL/L (ref 136–145)
TRIGL SERPL-MCNC: 149 MG/DL

## 2022-01-26 PROCEDURE — 80053 COMPREHEN METABOLIC PANEL: CPT | Performed by: FAMILY MEDICINE

## 2022-01-26 PROCEDURE — 80061 LIPID PANEL: CPT | Performed by: FAMILY MEDICINE

## 2022-07-26 ENCOUNTER — TRANSFERRED RECORDS (OUTPATIENT)
Dept: HEALTH INFORMATION MANAGEMENT | Facility: CLINIC | Age: 72
End: 2022-07-26

## 2022-07-26 ENCOUNTER — LAB REQUISITION (OUTPATIENT)
Dept: LAB | Facility: CLINIC | Age: 72
End: 2022-07-26

## 2022-07-26 DIAGNOSIS — I10 ESSENTIAL (PRIMARY) HYPERTENSION: ICD-10-CM

## 2022-07-26 LAB
ANION GAP SERPL CALCULATED.3IONS-SCNC: 12 MMOL/L (ref 7–15)
BUN SERPL-MCNC: 19.3 MG/DL (ref 8–23)
CALCIUM SERPL-MCNC: 9.7 MG/DL (ref 8.8–10.2)
CHLORIDE SERPL-SCNC: 103 MMOL/L (ref 98–107)
CREAT SERPL-MCNC: 1.08 MG/DL (ref 0.67–1.17)
DEPRECATED HCO3 PLAS-SCNC: 25 MMOL/L (ref 22–29)
GFR SERPL CREATININE-BSD FRML MDRD: 73 ML/MIN/1.73M2
GLUCOSE SERPL-MCNC: 134 MG/DL (ref 70–99)
POTASSIUM SERPL-SCNC: 3.9 MMOL/L (ref 3.4–5.3)
SODIUM SERPL-SCNC: 140 MMOL/L (ref 136–145)

## 2022-07-26 PROCEDURE — 80048 BASIC METABOLIC PNL TOTAL CA: CPT | Performed by: FAMILY MEDICINE

## 2022-08-29 DIAGNOSIS — I48.0 PAROXYSMAL ATRIAL FIBRILLATION (H): ICD-10-CM

## 2022-08-30 RX ORDER — RIVAROXABAN 20 MG/1
TABLET, FILM COATED ORAL
Qty: 90 TABLET | Refills: 2 | Status: SHIPPED | OUTPATIENT
Start: 2022-08-30 | End: 2023-06-09

## 2022-09-20 ENCOUNTER — TELEPHONE (OUTPATIENT)
Dept: CARDIOLOGY | Facility: CLINIC | Age: 72
End: 2022-09-20

## 2022-09-20 NOTE — TELEPHONE ENCOUNTER
[facility] Call Center    Phone Message    May a detailed message be left on voicemail: yes     Reason for Call: Order(s): Other:   Reason for requested: Hold & Bridge Orders for a Colonoscopy on 10/3  Hold Xarelto for 3 days prior   If the provider does not want a 3 day hold please send last creatinine result and date it was drawn  Date needed: 10/26  Provider name: Anisha Adamson      Action Taken: Other: Cardiology    Travel Screening: Not Applicable

## 2022-09-20 NOTE — TELEPHONE ENCOUNTER
Okay to hold Eliquis for 2 to 3 days prior to colonoscopy, restart ASAP afterwards.  No bridging recommended.  Thanks,  Anisha

## 2022-09-20 NOTE — TELEPHONE ENCOUNTER
1st Attempt to contact [facility], detailed message discussing the below with contact information was left per pt request.  9/20/2022 3:11 PM  Ashley Joy RN

## 2022-09-20 NOTE — TELEPHONE ENCOUNTER
Request for Anticoagulation Interruption    Procedure: Colonoscopy  Date of Procedure: 10/3/22  Anticoagulation Medication: Xarelto  [id number] score: 2  CrCl, mL/min: 85.21  Previous Procedures: Pt has not had hx of recent PVI, DCCV, or LAAO, restricting interruption in AC  Guidelines: Low Risk (Eliquis, Xarelto) with CrCl ml/min: > or = to 30 discontinue > or = to  24hrs, 15-29 discontinue > or = to 36hrs, <15 No Data consider anti Xa level an/or > or = to 48 hrs. Uncertain/Intermediate/High Risk (Eliquis, Xarelto) with CrCl ml/min: > or = to 30 discontinue > or = to 48 hrs, < 30 No data consider anti Xa level and/or > or = to 72 hrs    Please advise hold orders, with or without bridging  Thanks you,  9/20/2022 12:48 PM  Ashley Joy RN     CrCl Calculator Cockcroft-Gault Equation- Micromedex    2017 ACC Expert ConsensusDecision Pathway for PeriproceduralManagement of Anticoagulation inPatients With Nonvalvular Atrial Fibrillation    male  72 year old  Wt Readings from Last 1 Encounters:   07/15/21 98.9 kg (218 lb)     Most Recent 3 BMP's:  Recent Labs   Lab Test 07/26/22  0924 01/26/22  0706 12/07/20  1056    141 142   POTASSIUM 3.9 3.6 4.2   CHLORIDE 103 104 106   CO2 25 25 28   BUN 19.3 23 17   CR 1.08 1.45* 1.24   ANIONGAP 12 12 8   ISAAC 9.7 9.8 9.9   * 138* 113

## 2022-10-25 ENCOUNTER — OFFICE VISIT (OUTPATIENT)
Dept: CARDIOLOGY | Facility: CLINIC | Age: 72
End: 2022-10-25
Payer: COMMERCIAL

## 2022-10-25 VITALS
RESPIRATION RATE: 16 BRPM | HEIGHT: 68 IN | WEIGHT: 222 LBS | DIASTOLIC BLOOD PRESSURE: 78 MMHG | SYSTOLIC BLOOD PRESSURE: 128 MMHG | BODY MASS INDEX: 33.65 KG/M2 | HEART RATE: 60 BPM

## 2022-10-25 DIAGNOSIS — G47.33 OBSTRUCTIVE SLEEP APNEA SYNDROME: Primary | ICD-10-CM

## 2022-10-25 DIAGNOSIS — I10 ESSENTIAL HYPERTENSION: ICD-10-CM

## 2022-10-25 DIAGNOSIS — I48.0 PAROXYSMAL ATRIAL FIBRILLATION (H): ICD-10-CM

## 2022-10-25 DIAGNOSIS — Z98.890 STATUS POST CATHETER ABLATION OF ATRIAL FIBRILLATION: ICD-10-CM

## 2022-10-25 PROCEDURE — 99213 OFFICE O/P EST LOW 20 MIN: CPT | Performed by: INTERNAL MEDICINE

## 2022-10-25 NOTE — LETTER
"10/25/2022    Gael Staley MD  [address] [place] [address]    RE: Conrad Carson       Dear Colleague,     I had the pleasure of seeing Conrad Carson in the [facility].      [facility]  [phone]      Assessment/Recommendations   Patient with known history of paroxysmal atrial fibrillation.  Status post pulmonary vein isolation procedure 2019 and only 1 episode since then which was symptomatic.  He continues to take Xarelto.  He might consider getting apple watch if the electrophysiologist would allow him to come off of Xarelto and I will check that out.    He is not very active not strongly encouraged him to go for a walk at least 30 minutes and at least 5 times a week.  He will take that under advisement.    We will plan on seeing him back in 1 year, but of course to be happy to see him sooner if questions or problems arise.    Thank you for allowing us to participate in his care.       History of Present Illness/Subjective    Mr. Conrad Carson is a 72 year old male with known paroxysmal atrial fibrillation and hypertension.  He has been doing well and has not had any atrial fibrillation this past year.  He had 1 episode after his ablation and it spontaneously resolved.  He has not had any syncope, near syncope, chest discomfort, and he uses a CPAP mask religiously.  He does get some minimal peripheral edema and this is not new.  He does not walk much but yesterday got in 6000 steps but does not go out for walk on a regular basis at all.  Normal     Physical Examination Review of Systems   /78 (BP Location: Left arm, Patient Position: Sitting, Cuff Size: Adult Large)   Pulse 60   Resp 16   Ht 1.727 m (5' 8\")   Wt 100.7 kg (222 lb)   BMI 33.75 kg/m    Body mass index is 33.75 kg/m .  Wt Readings from Last 3 Encounters:   10/25/22 100.7 kg (222 lb)   07/15/21 98.9 kg (218 lb)   01/18/21 99.3 kg (219 lb)     General Appearance:   Alert, " "cooperative and in no acute distress.   ENT/Mouth: Patient wearing a mask.      EYES:  no scleral icterus, normal conjunctivae   Neck: JVP normal. No Hepatojugular reflux. Thyroid not visualized.   Chest/Lungs:   Lungs are clear to auscultation, equal chest wall expansion.   Cardiovascular:   S1, S2 without murmur ,clicks or rubs. Brachial, radial and posterior tibial pulses are intact and symetric. No carotid bruits noted   Abdomen:  Nontender. BS+. No bruits.   Extremities: No cyanosis, clubbing and trace pretibial edema   Skin: no xanthelasma, warm.    Neurologic: normal arm movement bilateral, no tremors     Psychiatric: Appropriate affect.      Enc Vitals  BP: 128/78  Pulse: 60  Resp: 16  Weight: 100.7 kg (222 lb) (with shoes)  Height: 172.7 cm (5' 8\")                                           Medical History  Surgical History Family History Social History   Past Medical History:   Diagnosis Date     Atrial fibrillation (H)      HLD (hyperlipidemia)      HTN (hypertension)      Prostate cancer (H)      Sleep apnea      Status post catheter ablation of atrial fibrillation 3/27/2019    PVI Mar 27, 2019 (cryo-PVI + RA-CTI line)    Past Surgical History:   Procedure Laterality Date     EP ABLATION AFLUTTER  3/27/2019    Procedure: EP Ablation Atrial Flutter;  Surgeon: Mayur Lu MD;  Location: [facility] Cath Lab;  Service: Cardiology     EP ABLATION PVI Left 3/27/2019    Procedure: EP Ablation PVI;  Surgeon: Mayur Lu MD;  Location: [facility];  Service: Cardiology     EXCISE GANGLION WRIST Right     ring finger     HERNIA REPAIR, UMBILICAL       [place] CARDIOVERSION ELECTIVE ARRHYTHMIA EXTERNAL      10/01/2009, 3/3/18     PROSTATECTOMY  12/2013     TONSILLECTOMY      Family History   Problem Relation Age of Onset     No Known Problems Mother      Cancer Father      Dyslipidemia Father      Aneurysm Father         ruputre aortic aneruysm     No Known Problems Sister      No Known Problems " Brother     Social History     Socioeconomic History     Marital status:      Spouse name: Not on file     Number of children: Not on file     Years of education: Not on file     Highest education level: Not on file   Occupational History     Not on file   Tobacco Use     Smoking status: Never     Smokeless tobacco: Never   Substance and Sexual Activity     Alcohol use: Yes     Alcohol/week: 2.0 standard drinks     Drug use: No     Sexual activity: Not on file   Other Topics Concern     Not on file   Social History Narrative     Not on file     Social Determinants of Health     Financial Resource Strain: Not on file   Food Insecurity: Not on file   Transportation Needs: Not on file   Physical Activity: Not on file   Stress: Not on file   Social Connections: Not on file   Intimate Partner Violence: Not on file   Housing Stability: Not on file          Medications  Allergies   Current Outpatient Medications   Medication Sig Dispense Refill     amLODIPine (NORVASC) 10 MG tablet [AMLODIPINE (NORVASC) 10 MG TABLET] Take 10 mg by mouth daily.       atenolol (TENORMIN) 50 MG tablet [ATENOLOL (TENORMIN) 50 MG TABLET] Take 50 mg by mouth 2 (two) times a day.       atorvastatin (LIPITOR) 40 MG tablet Take 80 mg by mouth daily       b complex vitamins capsule [B COMPLEX VITAMINS CAPSULE] 1 capsule daily.       cholecalciferol, vitamin D3, (VITAMIN D3) 1,000 unit capsule [CHOLECALCIFEROL, VITAMIN D3, (VITAMIN D3) 1,000 UNIT CAPSULE] 1,000 Units 2 (two) times a day.       cimetidine (TAGAMET) 800 MG tablet [CIMETIDINE (TAGAMET) 800 MG TABLET] 400 mg daily. Take as directed.       clotrimazole-betamethasone (LOTRISONE) 1-0.05 % external cream APPLY A THIN LAYER TOPICALLY TWICE DAILY       DOCOSAHEXANOIC ACID/EPA (FISH OIL ORAL) [DOCOSAHEXANOIC ACID/EPA (FISH OIL ORAL)] 1 capsule 2 (two) times a day. 1200 MG Capsule       gemfibrozil (LOPID) 600 MG tablet [GEMFIBROZIL (LOPID) 600 MG TABLET] 600 mg 2 (two) times a day.        lisinopril-hydrochlorothiazide (ZESTORETIC) 20-25 MG tablet TAKE 1 TABLET BY MOUTH EVERY DAY 90 tablet 1     miscellaneous medical supply (MONOJECT SHARPS WALL CABINET) Misc [MISCELLANEOUS MEDICAL SUPPLY (MONOJECT SHARPS WALL CABINET) MISC] CPAP machine for home use at pressure: 9, nasal mask with cushion x 1,       multivitamin (MULTIVITAMIN) per tablet [MULTIVITAMIN (MULTIVITAMIN) PER TABLET] 1 tablet daily.       niacin 500 MG tablet [NIACIN 500 MG TABLET] 500 mg daily. Take as directed.       omeprazole (PRILOSEC) 20 MG capsule [OMEPRAZOLE (PRILOSEC) 20 MG CAPSULE] 20 mg daily.       VIAGRA 100 mg tablet [VIAGRA 100 MG TABLET] AS DIRECTED       XARELTO ANTICOAGULANT 20 MG TABS tablet TAKE 1 TABLET BY MOUTH DAILY WITH DINNER 90 tablet 2    No Known Allergies      Lab Results    Chemistry/lipid CBC Cardiac Enzymes/BNP/TSH/INR   Lab Results   Component Value Date    CHOL 156 01/26/2022    HDL 30 (L) 01/26/2022    TRIG 149 01/26/2022    BUN 19.3 07/26/2022     07/26/2022    CO2 25 07/26/2022    Lab Results   Component Value Date    WBC 8.4 12/07/2020    HGB 15.4 12/07/2020    HCT 43.8 12/07/2020    MCV 90 12/07/2020     12/07/2020    Lab Results   Component Value Date    TROPONINI <0.01 09/17/2018    TSH 1.47 02/14/2019    INR 1.03 03/03/2018                Thank you for allowing me to participate in the care of your patient.      Sincerely,     Dave Tamez MD     [facility]  cc:   Dave Tamez MD  [address]  [place] [address]

## 2022-10-25 NOTE — PROGRESS NOTES
"    [facility]  [phone]      Assessment/Recommendations   Patient with known history of paroxysmal atrial fibrillation.  Status post pulmonary vein isolation procedure 2019 and only 1 episode since then which was symptomatic.  He continues to take Xarelto.  He might consider getting apple watch if the electrophysiologist would allow him to come off of Xarelto and I will check that out.    He is not very active not strongly encouraged him to go for a walk at least 30 minutes and at least 5 times a week.  He will take that under advisement.    We will plan on seeing him back in 1 year, but of course to be happy to see him sooner if questions or problems arise.    Thank you for allowing us to participate in his care.       History of Present Illness/Subjective    Mr. Conrad Carson is a 72 year old male with known paroxysmal atrial fibrillation and hypertension.  He has been doing well and has not had any atrial fibrillation this past year.  He had 1 episode after his ablation and it spontaneously resolved.  He has not had any syncope, near syncope, chest discomfort, and he uses a CPAP mask religiously.  He does get some minimal peripheral edema and this is not new.  He does not walk much but yesterday got in 6000 steps but does not go out for walk on a regular basis at all.  Normal     Physical Examination Review of Systems   /78 (BP Location: Left arm, Patient Position: Sitting, Cuff Size: Adult Large)   Pulse 60   Resp 16   Ht 1.727 m (5' 8\")   Wt 100.7 kg (222 lb)   BMI 33.75 kg/m    Body mass index is 33.75 kg/m .  Wt Readings from Last 3 Encounters:   10/25/22 100.7 kg (222 lb)   07/15/21 98.9 kg (218 lb)   01/18/21 99.3 kg (219 lb)     General Appearance:   Alert, cooperative and in no acute distress.   ENT/Mouth: Patient wearing a mask.      EYES:  no scleral icterus, normal conjunctivae   Neck: JVP normal. No Hepatojugular reflux. Thyroid not visualized.   Chest/Lungs:   " "Lungs are clear to auscultation, equal chest wall expansion.   Cardiovascular:   S1, S2 without murmur ,clicks or rubs. Brachial, radial and posterior tibial pulses are intact and symetric. No carotid bruits noted   Abdomen:  Nontender. BS+. No bruits.   Extremities: No cyanosis, clubbing and trace pretibial edema   Skin: no xanthelasma, warm.    Neurologic: normal arm movement bilateral, no tremors     Psychiatric: Appropriate affect.      Enc Vitals  BP: 128/78  Pulse: 60  Resp: 16  Weight: 100.7 kg (222 lb) (with shoes)  Height: 172.7 cm (5' 8\")                                           Medical History  Surgical History Family History Social History   Past Medical History:   Diagnosis Date     Atrial fibrillation (H)      HLD (hyperlipidemia)      HTN (hypertension)      Prostate cancer (H)      Sleep apnea      Status post catheter ablation of atrial fibrillation 3/27/2019    PVI Mar 27, 2019 (cryo-PVI + RA-CTI line)    Past Surgical History:   Procedure Laterality Date     EP ABLATION AFLUTTER  3/27/2019    Procedure: EP Ablation Atrial Flutter;  Surgeon: Mayur Lu MD;  Location: [facility] Cath Lab;  Service: Cardiology     EP ABLATION PVI Left 3/27/2019    Procedure: EP Ablation PVI;  Surgeon: Mayur Lu MD;  Location: [facility] Cath Lab;  Service: Cardiology     EXCISE GANGLION WRIST Right     ring finger     HERNIA REPAIR, UMBILICAL       RI CARDIOVERSION ELECTIVE ARRHYTHMIA EXTERNAL      10/01/2009, 3/3/18     PROSTATECTOMY  12/2013     TONSILLECTOMY      Family History   Problem Relation Age of Onset     No Known Problems Mother      Cancer Father      Dyslipidemia Father      Aneurysm Father         ruputre aortic aneruysm     No Known Problems Sister      No Known Problems Brother     Social History     Socioeconomic History     Marital status:      Spouse name: Not on file     Number of children: Not on file     Years of education: Not on file     Highest education level: Not on " file   Occupational History     Not on file   Tobacco Use     Smoking status: Never     Smokeless tobacco: Never   Substance and Sexual Activity     Alcohol use: Yes     Alcohol/week: 2.0 standard drinks     Drug use: No     Sexual activity: Not on file   Other Topics Concern     Not on file   Social History Narrative     Not on file     Social Determinants of Health     Financial Resource Strain: Not on file   Food Insecurity: Not on file   Transportation Needs: Not on file   Physical Activity: Not on file   Stress: Not on file   Social Connections: Not on file   Intimate Partner Violence: Not on file   Housing Stability: Not on file          Medications  Allergies   Current Outpatient Medications   Medication Sig Dispense Refill     amLODIPine (NORVASC) 10 MG tablet [AMLODIPINE (NORVASC) 10 MG TABLET] Take 10 mg by mouth daily.       atenolol (TENORMIN) 50 MG tablet [ATENOLOL (TENORMIN) 50 MG TABLET] Take 50 mg by mouth 2 (two) times a day.       atorvastatin (LIPITOR) 40 MG tablet Take 80 mg by mouth daily       b complex vitamins capsule [B COMPLEX VITAMINS CAPSULE] 1 capsule daily.       cholecalciferol, vitamin D3, (VITAMIN D3) 1,000 unit capsule [CHOLECALCIFEROL, VITAMIN D3, (VITAMIN D3) 1,000 UNIT CAPSULE] 1,000 Units 2 (two) times a day.       cimetidine (TAGAMET) 800 MG tablet [CIMETIDINE (TAGAMET) 800 MG TABLET] 400 mg daily. Take as directed.       clotrimazole-betamethasone (LOTRISONE) 1-0.05 % external cream APPLY A THIN LAYER TOPICALLY TWICE DAILY       DOCOSAHEXANOIC ACID/EPA (FISH OIL ORAL) [DOCOSAHEXANOIC ACID/EPA (FISH OIL ORAL)] 1 capsule 2 (two) times a day. 1200 MG Capsule       gemfibrozil (LOPID) 600 MG tablet [GEMFIBROZIL (LOPID) 600 MG TABLET] 600 mg 2 (two) times a day.       lisinopril-hydrochlorothiazide (ZESTORETIC) 20-25 MG tablet TAKE 1 TABLET BY MOUTH EVERY DAY 90 tablet 1     miscellaneous medical supply (MONOJECT SHARPS WALL CABINET) Misc [MISCELLANEOUS MEDICAL SUPPLY ([facility]  [facility]] CPAP machine for home use at pressure: 9, nasal mask with cushion x 1,       multivitamin (MULTIVITAMIN) per tablet [MULTIVITAMIN (MULTIVITAMIN) PER TABLET] 1 tablet daily.       niacin 500 MG tablet [NIACIN 500 MG TABLET] 500 mg daily. Take as directed.       omeprazole (PRILOSEC) 20 MG capsule [OMEPRAZOLE (PRILOSEC) 20 MG CAPSULE] 20 mg daily.       VIAGRA 100 mg tablet [VIAGRA 100 MG TABLET] AS DIRECTED       XARELTO ANTICOAGULANT 20 MG TABS tablet TAKE 1 TABLET BY MOUTH DAILY WITH DINNER 90 tablet 2    No Known Allergies      Lab Results    Chemistry/lipid CBC Cardiac Enzymes/BNP/TSH/INR   Lab Results   Component Value Date    CHOL 156 01/26/2022    HDL 30 (L) 01/26/2022    TRIG 149 01/26/2022    BUN 19.3 07/26/2022     07/26/2022    CO2 25 07/26/2022    Lab Results   Component Value Date    WBC 8.4 12/07/2020    HGB 15.4 12/07/2020    HCT 43.8 12/07/2020    MCV 90 12/07/2020     12/07/2020    Lab Results   Component Value Date    TROPONINI <0.01 09/17/2018    TSH 1.47 02/14/2019    INR 1.03 03/03/2018

## 2022-11-17 ENCOUNTER — TELEPHONE (OUTPATIENT)
Dept: CARDIOLOGY | Facility: CLINIC | Age: 72
End: 2022-11-17

## 2022-11-17 NOTE — TELEPHONE ENCOUNTER
----- Message -----  From: Dave Tamez MD  Sent: 11/9/2022  12:36 PM CST  To: YINA Arora,    Can you share Dr. Lu's thoughts with patient to see what he thinks.  If he wants to go ahead we can get a 14-day monitor now    ThanksDave  ----- Message -----  From: Mayur Lu MD  Sent: 11/3/2022  10:28 PM CST  To: Rosa Cesar RN, MD Dave Khanna,  He will be a [id number] score of 3 in 2 years which would make him a candidate for left atrial appendage closure.    If he is adamant about coming off OAC then I would have him wear a 14-day MCOT monitor to look for any evidence of recurrent (silent) atrial fibrillation at this time.  If his monitor is negative then he could do daily rhythm checks with Apple Watch or a Neo PLM Gus device.  If he does make that transition then he should be seen back by the EP GUS's in 6 months.  Thanks   Mayur

## 2022-11-21 NOTE — TELEPHONE ENCOUNTER
Reviewed with patient. He notes that he will continue anticoag therapy at this time. Advised to call back at any point in the future if he reconsiders. Patient verbalized understanding.   No other questions/concerns at this time. -jonathan

## 2023-01-25 ENCOUNTER — LAB REQUISITION (OUTPATIENT)
Dept: LAB | Facility: CLINIC | Age: 73
End: 2023-01-25

## 2023-01-25 DIAGNOSIS — I10 ESSENTIAL (PRIMARY) HYPERTENSION: ICD-10-CM

## 2023-01-25 DIAGNOSIS — E78.5 HYPERLIPIDEMIA, UNSPECIFIED: ICD-10-CM

## 2023-01-25 LAB
ALBUMIN SERPL BCG-MCNC: 4.2 G/DL (ref 3.5–5.2)
ALP SERPL-CCNC: 83 U/L (ref 40–129)
ALT SERPL W P-5'-P-CCNC: 21 U/L (ref 10–50)
ANION GAP SERPL CALCULATED.3IONS-SCNC: 13 MMOL/L (ref 7–15)
AST SERPL W P-5'-P-CCNC: 17 U/L (ref 10–50)
BILIRUB SERPL-MCNC: 0.6 MG/DL
BUN SERPL-MCNC: 17.9 MG/DL (ref 8–23)
CALCIUM SERPL-MCNC: 9.6 MG/DL (ref 8.8–10.2)
CHLORIDE SERPL-SCNC: 106 MMOL/L (ref 98–107)
CHOLEST SERPL-MCNC: 141 MG/DL
CREAT SERPL-MCNC: 1.26 MG/DL (ref 0.67–1.17)
DEPRECATED HCO3 PLAS-SCNC: 23 MMOL/L (ref 22–29)
GFR SERPL CREATININE-BSD FRML MDRD: 60 ML/MIN/1.73M2
GLUCOSE SERPL-MCNC: 151 MG/DL (ref 70–99)
HDLC SERPL-MCNC: 30 MG/DL
LDLC SERPL CALC-MCNC: 87 MG/DL
NONHDLC SERPL-MCNC: 111 MG/DL
POTASSIUM SERPL-SCNC: 3.8 MMOL/L (ref 3.4–5.3)
PROT SERPL-MCNC: 6.6 G/DL (ref 6.4–8.3)
SODIUM SERPL-SCNC: 142 MMOL/L (ref 136–145)
TRIGL SERPL-MCNC: 120 MG/DL

## 2023-01-25 PROCEDURE — 80053 COMPREHEN METABOLIC PANEL: CPT | Performed by: FAMILY MEDICINE

## 2023-01-25 PROCEDURE — 80061 LIPID PANEL: CPT | Performed by: FAMILY MEDICINE

## 2023-04-19 ENCOUNTER — TRANSFERRED RECORDS (OUTPATIENT)
Dept: HEALTH INFORMATION MANAGEMENT | Facility: CLINIC | Age: 73
End: 2023-04-19
Payer: COMMERCIAL

## 2023-04-19 ENCOUNTER — LAB REQUISITION (OUTPATIENT)
Dept: LAB | Facility: CLINIC | Age: 73
End: 2023-04-19

## 2023-04-19 DIAGNOSIS — R10.13 EPIGASTRIC PAIN: ICD-10-CM

## 2023-04-19 LAB
ALBUMIN SERPL BCG-MCNC: 4.7 G/DL (ref 3.5–5.2)
ALP SERPL-CCNC: 89 U/L (ref 40–129)
ALT SERPL W P-5'-P-CCNC: 24 U/L (ref 10–50)
AST SERPL W P-5'-P-CCNC: 20 U/L (ref 10–50)
BILIRUB DIRECT SERPL-MCNC: 0.23 MG/DL (ref 0–0.3)
BILIRUB SERPL-MCNC: 0.9 MG/DL
LIPASE SERPL-CCNC: 40 U/L (ref 13–60)
PROT SERPL-MCNC: 7.4 G/DL (ref 6.4–8.3)

## 2023-04-19 PROCEDURE — 83690 ASSAY OF LIPASE: CPT | Performed by: FAMILY MEDICINE

## 2023-04-19 PROCEDURE — 80076 HEPATIC FUNCTION PANEL: CPT | Performed by: FAMILY MEDICINE

## 2023-04-20 ENCOUNTER — LAB REQUISITION (OUTPATIENT)
Dept: LAB | Facility: CLINIC | Age: 73
End: 2023-04-20

## 2023-04-20 DIAGNOSIS — R10.13 EPIGASTRIC PAIN: ICD-10-CM

## 2023-04-20 PROCEDURE — 87338 HPYLORI STOOL AG IA: CPT | Performed by: FAMILY MEDICINE

## 2023-04-21 LAB — H PYLORI AG STL QL IA: NEGATIVE

## 2023-04-25 ENCOUNTER — TRANSFERRED RECORDS (OUTPATIENT)
Dept: HEALTH INFORMATION MANAGEMENT | Facility: CLINIC | Age: 73
End: 2023-04-25
Payer: COMMERCIAL

## 2023-05-02 ENCOUNTER — TRANSCRIBE ORDERS (OUTPATIENT)
Dept: OTHER | Age: 73
End: 2023-05-02

## 2023-05-02 ENCOUNTER — MEDICAL CORRESPONDENCE (OUTPATIENT)
Dept: HEALTH INFORMATION MANAGEMENT | Facility: CLINIC | Age: 73
End: 2023-05-02
Payer: COMMERCIAL

## 2023-05-02 DIAGNOSIS — K80.20 GALLSTONES: Primary | ICD-10-CM

## 2023-05-09 ENCOUNTER — OFFICE VISIT (OUTPATIENT)
Dept: SURGERY | Facility: CLINIC | Age: 73
End: 2023-05-09
Payer: COMMERCIAL

## 2023-05-09 VITALS — WEIGHT: 214 LBS | BODY MASS INDEX: 32.54 KG/M2

## 2023-05-09 DIAGNOSIS — K80.20 SYMPTOMATIC CHOLELITHIASIS: Primary | ICD-10-CM

## 2023-05-09 DIAGNOSIS — K80.20 GALLSTONES: ICD-10-CM

## 2023-05-09 PROCEDURE — 99204 OFFICE O/P NEW MOD 45 MIN: CPT | Performed by: SURGERY

## 2023-05-09 RX ORDER — INDOCYANINE GREEN AND WATER 25 MG
2.5 KIT INJECTION ONCE
Status: CANCELLED | OUTPATIENT
Start: 2023-05-09 | End: 2023-05-09

## 2023-05-09 RX ORDER — ATORVASTATIN CALCIUM 80 MG/1
1 TABLET, FILM COATED ORAL AT BEDTIME
COMMUNITY
Start: 2023-03-06

## 2023-05-09 RX ORDER — CEFAZOLIN SODIUM/WATER 2 G/20 ML
2 SYRINGE (ML) INTRAVENOUS
Status: CANCELLED | OUTPATIENT
Start: 2023-05-24

## 2023-05-09 RX ORDER — CEFAZOLIN SODIUM/WATER 2 G/20 ML
2 SYRINGE (ML) INTRAVENOUS SEE ADMIN INSTRUCTIONS
Status: CANCELLED | OUTPATIENT
Start: 2023-05-24

## 2023-05-09 RX ORDER — HEPARIN SODIUM 5000 [USP'U]/.5ML
5000 INJECTION, SOLUTION INTRAVENOUS; SUBCUTANEOUS
Status: CANCELLED | OUTPATIENT
Start: 2023-06-15

## 2023-05-09 NOTE — LETTER
5/9/2023         RE: Conrad Carson  [address] [place] [address]        Dear Colleague,    Thank you for referring your patient, Conrad Carson, to the [facility]. Please see a copy of my visit note below.    General Surgery H&P  Conrad Carson MRN# [id number]   Age/Sex: 72 year old male YOB: 1950     Reason for visit: 1. Symptomatic cholelithiasis    2. Gallstones            Referring physician: Gael Staley MD                   Assessment and Plan:   Assessment:  1.  Symptomatic cholelithiasis  2.  Gallstone  3.  History of A-fib on Xarelto    Due to the patient's ongoing recurrent symptoms from the gallstones, recommendation is to proceed with cholecystectomy.    Plan:  -To the OR for cholecystectomy.  -Patient will require preop clearance due to medical comorbidities.  Patient will require to be off Xarelto prior to surgery.  - The risks and benefits of the procedure were explained detail to the patient. The risks include infection, bleeding, damage to the surrounding structures. Patient verbalized understanding provided consent to undergo the procedure above.            Chief Complaint:     Chief Complaint   Patient presents with     Consult     Sadaf- US @ [facility]         History is obtained from the patient    HPI:   Conrad Carson is a 72 year old male who presents to the general surgery clinic with complaints of abdominal pain.  Patient states that he has some abdominal pain in the mid abdomen in the upper quadrants.  Patient also has gurgling around the abdomen as well.  Due to the symptoms for the last month or so, the patient saw his primary care physician and had an ultrasound completed.  Patient was found to have cholelithiasis.  Patient has no further complaints at this time.  He has a history of A-fib on Xarelto.  Patient has also had an ablation procedure for the A-fib.  Patient also has history of primary  repair of the umbilical hernia with no placement.          Past Medical History:     Past Medical History:   Diagnosis Date     Atrial fibrillation (H)      HLD (hyperlipidemia)      HTN (hypertension)      Prostate cancer (H)      Sleep apnea      Status post catheter ablation of atrial fibrillation 3/27/2019    PVI Mar 27, 2019 (cryo-PVI + RA-CTI line)              Past Surgical History:     Past Surgical History:   Procedure Laterality Date     EP ABLATION AFLUTTER  3/27/2019    Procedure: EP Ablation Atrial Flutter;  Surgeon: Mayur Lu MD;  Location: [facility] Cath Lab;  Service: Cardiology     EP ABLATION PVI Left 3/27/2019    Procedure: EP Ablation PVI;  Surgeon: Mayur Lu MD;  Location: [facility] Cath Lab;  Service: Cardiology     EXCISE GANGLION WRIST Right     ring finger     HERNIA REPAIR, UMBILICAL       CT CARDIOVERSION ELECTIVE ARRHYTHMIA EXTERNAL      10/01/2009, 3/3/18     PROSTATECTOMY  12/2013     TONSILLECTOMY               Social History:    reports that he has never smoked. He has never used smokeless tobacco. He reports current alcohol use of about 2.0 standard drinks of alcohol per week. He reports that he does not use drugs.           Family History:     Family History   Problem Relation Age of Onset     No Known Problems Mother      Cancer Father      Dyslipidemia Father      Aneurysm Father         ruputre aortic aneruysm     No Known Problems Sister      No Known Problems Brother               Allergies:   No Known Allergies           Medications:     Prior to Admission medications    Medication Sig Start Date End Date Taking? Authorizing Provider   amLODIPine (NORVASC) 10 MG tablet [AMLODIPINE (NORVASC) 10 MG TABLET] Take 10 mg by mouth daily. 5/14/20  Yes Provider, Historical   atenolol (TENORMIN) 50 MG tablet [ATENOLOL (TENORMIN) 50 MG TABLET] Take 50 mg by mouth 2 (two) times a day. 3/14/16  Yes Provider, Historical   atorvastatin (LIPITOR) 80 MG tablet Take 1 tablet  by mouth daily at 2 pm 3/6/23  Yes Reported, Patient   b complex vitamins capsule [B COMPLEX VITAMINS CAPSULE] 1 capsule daily. 2/11/15  Yes Provider, Historical   cholecalciferol, vitamin D3, (VITAMIN D3) 1,000 unit capsule [CHOLECALCIFEROL, VITAMIN D3, (VITAMIN D3) 1,000 UNIT CAPSULE] 1,000 Units 2 (two) times a day. 2/11/15  Yes Provider, Historical   cimetidine (TAGAMET) 800 MG tablet [CIMETIDINE (TAGAMET) 800 MG TABLET] 400 mg daily. Take as directed. 2/11/15  Yes Provider, Historical   clotrimazole-betamethasone (LOTRISONE) 1-0.05 % external cream APPLY A THIN LAYER TOPICALLY TWICE DAILY 1/25/21  Yes Reported, Patient   DOCOSAHEXANOIC ACID/EPA (FISH OIL ORAL) [DOCOSAHEXANOIC ACID/EPA (FISH OIL ORAL)] 1 capsule 2 (two) times a day. 1200 MG Capsule 2/11/15  Yes Provider, Historical   gemfibrozil (LOPID) 600 MG tablet [GEMFIBROZIL (LOPID) 600 MG TABLET] 600 mg 2 (two) times a day. 2/11/15  Yes Provider, Historical   lisinopril-hydrochlorothiazide (ZESTORETIC) 20-25 MG tablet TAKE 1 TABLET BY MOUTH EVERY DAY 1/3/23  Yes Dave Tamez MD   miscellaneous medical supply (MONOJECT SHARPS WALL CABINET) Misc [MISCELLANEOUS MEDICAL SUPPLY (MONOJECT SHARPS WALL CABINET) MISC] CPAP machine for home use at pressure: 9, nasal mask with cushion x 1, 6/3/19  Yes Provider, Historical   multivitamin (MULTIVITAMIN) per tablet [MULTIVITAMIN (MULTIVITAMIN) PER TABLET] 1 tablet daily. 2/11/15  Yes Provider, Historical   niacin 500 MG tablet [NIACIN 500 MG TABLET] 500 mg daily. Take as directed. 2/11/15  Yes Provider, Historical   omeprazole (PRILOSEC) 20 MG capsule [OMEPRAZOLE (PRILOSEC) 20 MG CAPSULE] 20 mg daily. 2/17/15  Yes Provider, Historical   VIAGRA 100 mg tablet [VIAGRA 100 MG TABLET] AS DIRECTED 1/15/16  Yes Provider, Historical   XARELTO ANTICOAGULANT 20 MG TABS tablet TAKE 1 TABLET BY MOUTH DAILY WITH DINNER 8/30/22  Yes Anisha Adamson APRN CNP              Review of Systems:   A 12 point Review of Systems is  negative other than noted in the HPI            Physical Exam:     Patient Vitals for the past 24 hrs:   Weight   05/09/23 1107 97.1 kg (214 lb)        [unfilled]   Constitutional:   awake, alert, cooperative, no apparent distress, and appears stated age       Eyes:   PERRL, conjunctiva/corneas clear, EOM's intact; no scleral edema or icterus noted        ENT:   Normocephalic, without obvious abnormality, atraumatic, Lips, mucosa, and tongue normal        Hematologic / Lymphatic:   No lymphadenopathy       Lungs:   Normal respiratory effort, no accessory muscle use       Cardiovascular:   Regular rate and rhythm       Abdomen:   Soft, nondistended, tender to palpation in the upper quadrants of the abdomen.       Musculoskeletal:   No obvious swelling, bruising or deformity       Skin:   Skin color and texture normal for patient, no rashes or lesions              Data:         All imaging studies reviewed by me. I reviewed the images, and I agree with findings of ultrasound with gallstones.      DO Vernon Santiago DO  General Surgeon  [facility]  Surgery [address] [address]?  Office: [phone]  Employed by - [facility]  Pager: [phone]             Again, thank you for allowing me to participate in the care of your patient.        Sincerely,        Vernon Bhat DO

## 2023-05-09 NOTE — PROGRESS NOTES
General Surgery H&P  Conrad Carson MRN# [id number]   Age/Sex: 72 year old male YOB: 1950     Reason for visit: 1. Symptomatic cholelithiasis    2. Gallstones            Referring physician: Gael Staley MD                   Assessment and Plan:   Assessment:  1.  Symptomatic cholelithiasis  2.  Gallstone  3.  History of A-fib on Xarelto    Due to the patient's ongoing recurrent symptoms from the gallstones, recommendation is to proceed with cholecystectomy.    Plan:  -To the OR for cholecystectomy.  -Patient will require preop clearance due to medical comorbidities.  Patient will require to be off Xarelto prior to surgery.  - The risks and benefits of the procedure were explained detail to the patient. The risks include infection, bleeding, damage to the surrounding structures. Patient verbalized understanding provided consent to undergo the procedure above.            Chief Complaint:     Chief Complaint   Patient presents with     Consult     Sadaf- US @ [facility]         History is obtained from the patient    HPI:   Conrad Carson is a 72 year old male who presents to the general surgery clinic with complaints of abdominal pain.  Patient states that he has some abdominal pain in the mid abdomen in the upper quadrants.  Patient also has gurgling around the abdomen as well.  Due to the symptoms for the last month or so, the patient saw his primary care physician and had an ultrasound completed.  Patient was found to have cholelithiasis.  Patient has no further complaints at this time.  He has a history of A-fib on Xarelto.  Patient has also had an ablation procedure for the A-fib.  Patient also has history of primary repair of the umbilical hernia with no placement.          Past Medical History:     Past Medical History:   Diagnosis Date     Atrial fibrillation (H)      HLD (hyperlipidemia)      HTN (hypertension)      Prostate cancer (H)      Sleep apnea      Status post catheter ablation of  atrial fibrillation 3/27/2019    PVI Mar 27, 2019 (cryo-PVI + RA-CTI line)              Past Surgical History:     Past Surgical History:   Procedure Laterality Date     EP ABLATION AFLUTTER  3/27/2019    Procedure: EP Ablation Atrial Flutter;  Surgeon: Mayur Lu MD;  Location: [facility] Cath Lab;  Service: Cardiology     EP ABLATION PVI Left 3/27/2019    Procedure: EP Ablation PVI;  Surgeon: Mayur Lu MD;  Location: [facility] Cath Lab;  Service: Cardiology     EXCISE GANGLION WRIST Right     ring finger     HERNIA REPAIR, UMBILICAL       MA CARDIOVERSION ELECTIVE ARRHYTHMIA EXTERNAL      10/01/2009, 3/3/18     PROSTATECTOMY  12/2013     TONSILLECTOMY               Social History:    reports that he has never smoked. He has never used smokeless tobacco. He reports current alcohol use of about 2.0 standard drinks of alcohol per week. He reports that he does not use drugs.           Family History:     Family History   Problem Relation Age of Onset     No Known Problems Mother      Cancer Father      Dyslipidemia Father      Aneurysm Father         ruputre aortic aneruysm     No Known Problems Sister      No Known Problems Brother               Allergies:   No Known Allergies           Medications:     Prior to Admission medications    Medication Sig Start Date End Date Taking? Authorizing Provider   amLODIPine (NORVASC) 10 MG tablet [AMLODIPINE (NORVASC) 10 MG TABLET] Take 10 mg by mouth daily. 5/14/20  Yes Provider, Historical   atenolol (TENORMIN) 50 MG tablet [ATENOLOL (TENORMIN) 50 MG TABLET] Take 50 mg by mouth 2 (two) times a day. 3/14/16  Yes Provider, Historical   atorvastatin (LIPITOR) 80 MG tablet Take 1 tablet by mouth daily at 2 pm 3/6/23  Yes Reported, Patient   b complex vitamins capsule [B COMPLEX VITAMINS CAPSULE] 1 capsule daily. 2/11/15  Yes Provider, Historical   cholecalciferol, vitamin D3, (VITAMIN D3) 1,000 unit capsule [CHOLECALCIFEROL, VITAMIN D3, (VITAMIN D3) 1,000 UNIT  CAPSULE] 1,000 Units 2 (two) times a day. 2/11/15  Yes Provider, Historical   cimetidine (TAGAMET) 800 MG tablet [CIMETIDINE (TAGAMET) 800 MG TABLET] 400 mg daily. Take as directed. 2/11/15  Yes Provider, Historical   clotrimazole-betamethasone (LOTRISONE) 1-0.05 % external cream APPLY A THIN LAYER TOPICALLY TWICE DAILY 1/25/21  Yes Reported, Patient   DOCOSAHEXANOIC ACID/EPA (FISH OIL ORAL) [DOCOSAHEXANOIC ACID/EPA (FISH OIL ORAL)] 1 capsule 2 (two) times a day. 1200 MG Capsule 2/11/15  Yes Provider, Historical   gemfibrozil (LOPID) 600 MG tablet [GEMFIBROZIL (LOPID) 600 MG TABLET] 600 mg 2 (two) times a day. 2/11/15  Yes Provider, Historical   lisinopril-hydrochlorothiazide (ZESTORETIC) 20-25 MG tablet TAKE 1 TABLET BY MOUTH EVERY DAY 1/3/23  Yes Dave Tamez MD   miscellaneous medical supply (MONOJECT SHARPS WALL CABINET) Misc [MISCELLANEOUS MEDICAL SUPPLY (MONOJECT SHARPS WALL CABINET) MISC] CPAP machine for home use at pressure: 9, nasal mask with cushion x 1, 6/3/19  Yes Provider, Historical   multivitamin (MULTIVITAMIN) per tablet [MULTIVITAMIN (MULTIVITAMIN) PER TABLET] 1 tablet daily. 2/11/15  Yes Provider, Historical   niacin 500 MG tablet [NIACIN 500 MG TABLET] 500 mg daily. Take as directed. 2/11/15  Yes Provider, Historical   omeprazole (PRILOSEC) 20 MG capsule [OMEPRAZOLE (PRILOSEC) 20 MG CAPSULE] 20 mg daily. 2/17/15  Yes Provider, Historical   VIAGRA 100 mg tablet [VIAGRA 100 MG TABLET] AS DIRECTED 1/15/16  Yes Provider, Historical   XARELTO ANTICOAGULANT 20 MG TABS tablet TAKE 1 TABLET BY MOUTH DAILY WITH DINNER 8/30/22  Yes Anisha Adamson APRN CNP              Review of Systems:   A 12 point Review of Systems is negative other than noted in the HPI            Physical Exam:     Patient Vitals for the past 24 hrs:   Weight   05/09/23 1107 97.1 kg (214 lb)        [unfilled]   Constitutional:   awake, alert, cooperative, no apparent distress, and appears stated age       Eyes:   PERRL,  conjunctiva/corneas clear, EOM's intact; no scleral edema or icterus noted        ENT:   Normocephalic, without obvious abnormality, atraumatic, Lips, mucosa, and tongue normal        Hematologic / Lymphatic:   No lymphadenopathy       Lungs:   Normal respiratory effort, no accessory muscle use       Cardiovascular:   Regular rate and rhythm       Abdomen:   Soft, nondistended, tender to palpation in the upper quadrants of the abdomen.       Musculoskeletal:   No obvious swelling, bruising or deformity       Skin:   Skin color and texture normal for patient, no rashes or lesions              Data:         All imaging studies reviewed by me. I reviewed the images, and I agree with findings of ultrasound with gallstones.      DO Vernon Santiago DO  General Surgeon  [facility]  Surgery [facility] - [address] [address]?  Office: [phone]  Employed by - [facility]  Pager: [phone]

## 2023-05-16 ENCOUNTER — LAB REQUISITION (OUTPATIENT)
Dept: LAB | Facility: CLINIC | Age: 73
End: 2023-05-16

## 2023-05-16 ENCOUNTER — TRANSFERRED RECORDS (OUTPATIENT)
Dept: HEALTH INFORMATION MANAGEMENT | Facility: CLINIC | Age: 73
End: 2023-05-16

## 2023-05-16 DIAGNOSIS — N39.0 URINARY TRACT INFECTION, SITE NOT SPECIFIED: ICD-10-CM

## 2023-05-16 DIAGNOSIS — Z01.818 ENCOUNTER FOR OTHER PREPROCEDURAL EXAMINATION: ICD-10-CM

## 2023-05-16 PROCEDURE — 87186 SC STD MICRODIL/AGAR DIL: CPT | Performed by: FAMILY MEDICINE

## 2023-05-16 PROCEDURE — 80048 BASIC METABOLIC PNL TOTAL CA: CPT | Performed by: FAMILY MEDICINE

## 2023-05-17 LAB
ANION GAP SERPL CALCULATED.3IONS-SCNC: 13 MMOL/L (ref 7–15)
BUN SERPL-MCNC: 11.9 MG/DL (ref 8–23)
CALCIUM SERPL-MCNC: 10 MG/DL (ref 8.8–10.2)
CHLORIDE SERPL-SCNC: 103 MMOL/L (ref 98–107)
CREAT SERPL-MCNC: 1.05 MG/DL (ref 0.67–1.17)
DEPRECATED HCO3 PLAS-SCNC: 24 MMOL/L (ref 22–29)
GFR SERPL CREATININE-BSD FRML MDRD: 75 ML/MIN/1.73M2
GLUCOSE SERPL-MCNC: 137 MG/DL (ref 70–99)
POTASSIUM SERPL-SCNC: 4.2 MMOL/L (ref 3.4–5.3)
SODIUM SERPL-SCNC: 140 MMOL/L (ref 136–145)

## 2023-05-19 LAB — BACTERIA UR CULT: ABNORMAL

## 2023-05-23 ENCOUNTER — TELEPHONE (OUTPATIENT)
Dept: SURGERY | Facility: CLINIC | Age: 73
End: 2023-05-23
Payer: COMMERCIAL

## 2023-05-23 NOTE — TELEPHONE ENCOUNTER
Kev Carson says he is having surgery tomorrow with LV but not sure if he should, says he had a UTI and now he is feeling some ear pain and not sure what to do, please call Kev.    Thank you

## 2023-05-25 ENCOUNTER — TELEPHONE (OUTPATIENT)
Dept: SURGERY | Facility: CLINIC | Age: 73
End: 2023-05-25
Payer: COMMERCIAL

## 2023-05-25 NOTE — TELEPHONE ENCOUNTER
Spoke with patient today regarding surgery scheduling- he was canceled earlier this week due to a UTI he was being treated for.    Went over details/instructions.    Surgery Letter sent via [employer]  (Please see LETTERS TAB in chart to retrieve a copy of this letter)

## 2023-05-25 NOTE — LETTER
We've received instruction to get you scheduled for surgery with Dr Bhat. We have that arranged as follows:     Pre-op Physical:  Call your primary clinic ([facility]) to schedule.    Surgery Date: 6/15/2023     Location: [facility]     Approximate Arrival Time: 1:00 pm   (Unless instructed differently by the pre-op call nurse)     Prep Tasks and Info:     A pre-op physical with your primary care doctor is required before surgery. This must be 10-30 days before surgery.  Since it required by anesthesia, your surgery will be cancelled if it's not done. Call your clinic asap to get this scheduled.    Review your medications with your primary care or prescribing physician; they will advise you which meds to stop and when, and when you can resume taking.  Certain medications like blood thinners need to be stopped in advance of surgery to proceed safely.    Please shower the evening before and morning of surgery with Hibiclens soap.  This can be found at your local pharmacy.     Fasting instructions will be provided by the pre-op nurse who will call you 1-3 days before surgery.  Typically we advise normal food up to 8 hours before you arrive for surgery. Clear liquids only from then until 2 hours before you arrive surgery then nothing at all by mouth.       Smoking impacts your body's ability to heal properly.  If you are a smoker, we strongly urge you to stop smoking 4-6 weeks before surgery. Plastic surgery patients are required to be nicotine free for 8 weeks before surgery.     You will need an adult to drive you home and stay with you 24 hours after surgery. Public transportation or Medical Van Services are not permitted.    Visitor restrictions are subject to change, please verify how many people can accompany you with the pre-op nurse when they call.    We always encourage you to notify your insurance any time you have medical tests or procedures scheduled including surgery. The number is  usually right on the back of your insurance card. Please call [facility] Cost of Care at [phone] if you'd like a surgery quote.       Call our office if you have any questions! Thank you!

## 2023-06-08 DIAGNOSIS — I48.0 PAROXYSMAL ATRIAL FIBRILLATION (H): ICD-10-CM

## 2023-06-15 ENCOUNTER — ANESTHESIA (OUTPATIENT)
Dept: SURGERY | Facility: HOSPITAL | Age: 73
End: 2023-06-15
Payer: COMMERCIAL

## 2023-06-15 ENCOUNTER — HOSPITAL ENCOUNTER (OUTPATIENT)
Facility: HOSPITAL | Age: 73
Discharge: HOME | End: 2023-06-15
Attending: SURGERY | Admitting: SURGERY
Payer: COMMERCIAL

## 2023-06-15 ENCOUNTER — ANESTHESIA EVENT (OUTPATIENT)
Dept: SURGERY | Facility: HOSPITAL | Age: 73
End: 2023-06-15
Payer: COMMERCIAL

## 2023-06-15 VITALS
TEMPERATURE: 98 F | RESPIRATION RATE: 16 BRPM | SYSTOLIC BLOOD PRESSURE: 124 MMHG | BODY MASS INDEX: 32.74 KG/M2 | OXYGEN SATURATION: 95 % | DIASTOLIC BLOOD PRESSURE: 64 MMHG | WEIGHT: 215.3 LBS | HEART RATE: 58 BPM

## 2023-06-15 DIAGNOSIS — K80.20 SYMPTOMATIC CHOLELITHIASIS: Primary | ICD-10-CM

## 2023-06-15 PROCEDURE — 250N000011 HC RX IP 250 OP 636: Performed by: NURSE ANESTHETIST, CERTIFIED REGISTERED

## 2023-06-15 PROCEDURE — 250N000025 HC SEVOFLURANE, PER MIN: Performed by: SURGERY

## 2023-06-15 PROCEDURE — 710N000012 HC RECOVERY PHASE 2, PER MINUTE: Performed by: SURGERY

## 2023-06-15 PROCEDURE — S2900 ROBOTIC SURGICAL SYSTEM: HCPCS | Performed by: SURGERY

## 2023-06-15 PROCEDURE — 88304 TISSUE EXAM BY PATHOLOGIST: CPT | Mod: 26 | Performed by: PATHOLOGY

## 2023-06-15 PROCEDURE — 88304 TISSUE EXAM BY PATHOLOGIST: CPT | Mod: TC | Performed by: SURGERY

## 2023-06-15 PROCEDURE — 258N000003 HC RX IP 258 OP 636: Performed by: NURSE ANESTHETIST, CERTIFIED REGISTERED

## 2023-06-15 PROCEDURE — 250N000009 HC RX 250: Performed by: NURSE ANESTHETIST, CERTIFIED REGISTERED

## 2023-06-15 PROCEDURE — 360N000080 HC SURGERY LEVEL 7, PER MIN: Performed by: SURGERY

## 2023-06-15 PROCEDURE — 250N000011 HC RX IP 250 OP 636: Performed by: SURGERY

## 2023-06-15 PROCEDURE — 999N000141 HC STATISTIC PRE-PROCEDURE NURSING ASSESSMENT: Performed by: SURGERY

## 2023-06-15 PROCEDURE — 370N000017 HC ANESTHESIA TECHNICAL FEE, PER MIN: Performed by: SURGERY

## 2023-06-15 PROCEDURE — 250N000011 HC RX IP 250 OP 636: Performed by: ANESTHESIOLOGY

## 2023-06-15 PROCEDURE — 250N000009 HC RX 250: Performed by: SURGERY

## 2023-06-15 PROCEDURE — 258N000003 HC RX IP 258 OP 636: Performed by: ANESTHESIOLOGY

## 2023-06-15 PROCEDURE — 272N000001 HC OR GENERAL SUPPLY STERILE: Performed by: SURGERY

## 2023-06-15 PROCEDURE — 96372 THER/PROPH/DIAG INJ SC/IM: CPT | Performed by: SURGERY

## 2023-06-15 PROCEDURE — 250N000013 HC RX MED GY IP 250 OP 250 PS 637: Performed by: ANESTHESIOLOGY

## 2023-06-15 PROCEDURE — 47562 LAPAROSCOPIC CHOLECYSTECTOMY: CPT | Performed by: SURGERY

## 2023-06-15 PROCEDURE — 710N000010 HC RECOVERY PHASE 1, LEVEL 2, PER MIN: Performed by: SURGERY

## 2023-06-15 RX ORDER — ONDANSETRON 2 MG/ML
4 INJECTION INTRAMUSCULAR; INTRAVENOUS EVERY 30 MIN PRN
Status: DISCONTINUED | OUTPATIENT
Start: 2023-06-15 | End: 2023-06-15 | Stop reason: HOSPADM

## 2023-06-15 RX ORDER — HALOPERIDOL 5 MG/ML
1 INJECTION INTRAMUSCULAR
Status: DISCONTINUED | OUTPATIENT
Start: 2023-06-15 | End: 2023-06-15 | Stop reason: HOSPADM

## 2023-06-15 RX ORDER — LIDOCAINE 40 MG/G
CREAM TOPICAL
Status: DISCONTINUED | OUTPATIENT
Start: 2023-06-15 | End: 2023-06-15 | Stop reason: HOSPADM

## 2023-06-15 RX ORDER — NALOXONE HYDROCHLORIDE 0.4 MG/ML
0.4 INJECTION, SOLUTION INTRAMUSCULAR; INTRAVENOUS; SUBCUTANEOUS
Status: DISCONTINUED | OUTPATIENT
Start: 2023-06-15 | End: 2023-06-15 | Stop reason: HOSPADM

## 2023-06-15 RX ORDER — CEFAZOLIN SODIUM/WATER 2 G/20 ML
2 SYRINGE (ML) INTRAVENOUS SEE ADMIN INSTRUCTIONS
Status: DISCONTINUED | OUTPATIENT
Start: 2023-06-15 | End: 2023-06-15 | Stop reason: HOSPADM

## 2023-06-15 RX ORDER — HYDROCODONE BITARTRATE AND ACETAMINOPHEN 5; 325 MG/1; MG/1
1 TABLET ORAL EVERY 6 HOURS PRN
Qty: 10 TABLET | Refills: 0 | Status: SHIPPED | OUTPATIENT
Start: 2023-06-15 | End: 2023-06-18

## 2023-06-15 RX ORDER — NALOXONE HYDROCHLORIDE 0.4 MG/ML
0.2 INJECTION, SOLUTION INTRAMUSCULAR; INTRAVENOUS; SUBCUTANEOUS
Status: DISCONTINUED | OUTPATIENT
Start: 2023-06-15 | End: 2023-06-15 | Stop reason: HOSPADM

## 2023-06-15 RX ORDER — PROPOFOL 10 MG/ML
INJECTION, EMULSION INTRAVENOUS PRN
Status: DISCONTINUED | OUTPATIENT
Start: 2023-06-15 | End: 2023-06-15

## 2023-06-15 RX ORDER — CEFAZOLIN SODIUM/WATER 2 G/20 ML
2 SYRINGE (ML) INTRAVENOUS
Status: COMPLETED | OUTPATIENT
Start: 2023-06-15 | End: 2023-06-15

## 2023-06-15 RX ORDER — LIDOCAINE HYDROCHLORIDE 10 MG/ML
INJECTION, SOLUTION INFILTRATION; PERINEURAL PRN
Status: DISCONTINUED | OUTPATIENT
Start: 2023-06-15 | End: 2023-06-15

## 2023-06-15 RX ORDER — FENTANYL CITRATE 50 UG/ML
25 INJECTION, SOLUTION INTRAMUSCULAR; INTRAVENOUS
Status: DISCONTINUED | OUTPATIENT
Start: 2023-06-15 | End: 2023-06-15 | Stop reason: HOSPADM

## 2023-06-15 RX ORDER — DOCUSATE SODIUM 100 MG/1
100 CAPSULE, LIQUID FILLED ORAL 2 TIMES DAILY
Qty: 30 CAPSULE | Refills: 0 | Status: SHIPPED | OUTPATIENT
Start: 2023-06-15 | End: 2023-09-08

## 2023-06-15 RX ORDER — DEXAMETHASONE SODIUM PHOSPHATE 10 MG/ML
INJECTION, SOLUTION INTRAMUSCULAR; INTRAVENOUS PRN
Status: DISCONTINUED | OUTPATIENT
Start: 2023-06-15 | End: 2023-06-15

## 2023-06-15 RX ORDER — FENTANYL CITRATE 50 UG/ML
INJECTION, SOLUTION INTRAMUSCULAR; INTRAVENOUS PRN
Status: DISCONTINUED | OUTPATIENT
Start: 2023-06-15 | End: 2023-06-15

## 2023-06-15 RX ORDER — ONDANSETRON 4 MG/1
4 TABLET, ORALLY DISINTEGRATING ORAL EVERY 30 MIN PRN
Status: DISCONTINUED | OUTPATIENT
Start: 2023-06-15 | End: 2023-06-15 | Stop reason: HOSPADM

## 2023-06-15 RX ORDER — ONDANSETRON 2 MG/ML
INJECTION INTRAMUSCULAR; INTRAVENOUS PRN
Status: DISCONTINUED | OUTPATIENT
Start: 2023-06-15 | End: 2023-06-15

## 2023-06-15 RX ORDER — HEPARIN SODIUM 5000 [USP'U]/.5ML
5000 INJECTION, SOLUTION INTRAVENOUS; SUBCUTANEOUS
Status: COMPLETED | OUTPATIENT
Start: 2023-06-15 | End: 2023-06-15

## 2023-06-15 RX ORDER — OXYCODONE HYDROCHLORIDE 5 MG/1
5 TABLET ORAL
Status: COMPLETED | OUTPATIENT
Start: 2023-06-15 | End: 2023-06-15

## 2023-06-15 RX ORDER — FENTANYL CITRATE 50 UG/ML
25 INJECTION, SOLUTION INTRAMUSCULAR; INTRAVENOUS EVERY 5 MIN PRN
Status: DISCONTINUED | OUTPATIENT
Start: 2023-06-15 | End: 2023-06-15 | Stop reason: HOSPADM

## 2023-06-15 RX ORDER — FENTANYL CITRATE 50 UG/ML
50 INJECTION, SOLUTION INTRAMUSCULAR; INTRAVENOUS EVERY 5 MIN PRN
Status: DISCONTINUED | OUTPATIENT
Start: 2023-06-15 | End: 2023-06-15 | Stop reason: HOSPADM

## 2023-06-15 RX ORDER — ACETAMINOPHEN 325 MG/1
975 TABLET ORAL ONCE
Status: COMPLETED | OUTPATIENT
Start: 2023-06-15 | End: 2023-06-15

## 2023-06-15 RX ORDER — INDOCYANINE GREEN AND WATER 25 MG
2.5 KIT INJECTION ONCE
Status: COMPLETED | OUTPATIENT
Start: 2023-06-15 | End: 2023-06-15

## 2023-06-15 RX ORDER — SODIUM CHLORIDE, SODIUM LACTATE, POTASSIUM CHLORIDE, CALCIUM CHLORIDE 600; 310; 30; 20 MG/100ML; MG/100ML; MG/100ML; MG/100ML
INJECTION, SOLUTION INTRAVENOUS CONTINUOUS
Status: DISCONTINUED | OUTPATIENT
Start: 2023-06-15 | End: 2023-06-15 | Stop reason: HOSPADM

## 2023-06-15 RX ORDER — MEPERIDINE HYDROCHLORIDE 25 MG/ML
12.5 INJECTION INTRAMUSCULAR; INTRAVENOUS; SUBCUTANEOUS EVERY 5 MIN PRN
Status: DISCONTINUED | OUTPATIENT
Start: 2023-06-15 | End: 2023-06-15 | Stop reason: HOSPADM

## 2023-06-15 RX ADMIN — FENTANYL CITRATE 50 MCG: 50 INJECTION, SOLUTION INTRAMUSCULAR; INTRAVENOUS at 16:28

## 2023-06-15 RX ADMIN — PROPOFOL 50 MG: 10 INJECTION, EMULSION INTRAVENOUS at 16:08

## 2023-06-15 RX ADMIN — INDOCYANINE GREEN AND WATER 2.5 MG: KIT at 14:25

## 2023-06-15 RX ADMIN — Medication 2 G: at 14:49

## 2023-06-15 RX ADMIN — ACETAMINOPHEN 975 MG: 325 TABLET ORAL at 14:23

## 2023-06-15 RX ADMIN — FENTANYL CITRATE 50 MCG: 50 INJECTION, SOLUTION INTRAMUSCULAR; INTRAVENOUS at 16:33

## 2023-06-15 RX ADMIN — SUGAMMADEX 200 MG: 100 INJECTION, SOLUTION INTRAVENOUS at 16:06

## 2023-06-15 RX ADMIN — OXYCODONE HYDROCHLORIDE 5 MG: 5 TABLET ORAL at 17:29

## 2023-06-15 RX ADMIN — DEXMEDETOMIDINE HYDROCHLORIDE 8 MCG: 100 INJECTION, SOLUTION INTRAVENOUS at 15:26

## 2023-06-15 RX ADMIN — LIDOCAINE HYDROCHLORIDE 30 MG: 10 INJECTION, SOLUTION INFILTRATION; PERINEURAL at 14:58

## 2023-06-15 RX ADMIN — ROCURONIUM BROMIDE 25 MG: 50 INJECTION, SOLUTION INTRAVENOUS at 15:39

## 2023-06-15 RX ADMIN — ONDANSETRON 4 MG: 2 INJECTION INTRAMUSCULAR; INTRAVENOUS at 15:58

## 2023-06-15 RX ADMIN — DEXAMETHASONE SODIUM PHOSPHATE 10 MG: 10 INJECTION, SOLUTION INTRAMUSCULAR; INTRAVENOUS at 14:59

## 2023-06-15 RX ADMIN — FENTANYL CITRATE 100 MCG: 50 INJECTION, SOLUTION INTRAMUSCULAR; INTRAVENOUS at 14:58

## 2023-06-15 RX ADMIN — DEXMEDETOMIDINE HYDROCHLORIDE 12 MCG: 100 INJECTION, SOLUTION INTRAVENOUS at 15:33

## 2023-06-15 RX ADMIN — SODIUM CHLORIDE, POTASSIUM CHLORIDE, SODIUM LACTATE AND CALCIUM CHLORIDE: 600; 310; 30; 20 INJECTION, SOLUTION INTRAVENOUS at 14:26

## 2023-06-15 RX ADMIN — HEPARIN SODIUM 5000 UNITS: 10000 INJECTION, SOLUTION INTRAVENOUS; SUBCUTANEOUS at 14:25

## 2023-06-15 RX ADMIN — PROPOFOL 200 MG: 10 INJECTION, EMULSION INTRAVENOUS at 14:58

## 2023-06-15 RX ADMIN — ROCURONIUM BROMIDE 50 MG: 50 INJECTION, SOLUTION INTRAVENOUS at 14:59

## 2023-06-15 RX ADMIN — MEPERIDINE HYDROCHLORIDE 12.5 MG: 25 INJECTION, SOLUTION INTRAMUSCULAR; INTRAVENOUS; SUBCUTANEOUS at 16:34

## 2023-06-15 ASSESSMENT — ENCOUNTER SYMPTOMS: DYSRHYTHMIAS: 1

## 2023-06-15 ASSESSMENT — ACTIVITIES OF DAILY LIVING (ADL)
ADLS_ACUITY_SCORE: 35
ADLS_ACUITY_SCORE: 35

## 2023-06-15 NOTE — ANESTHESIA PROCEDURE NOTES
Airway       Patient location during procedure: OR       Procedure Start/Stop Times: 6/15/2023 3:02 PM  Staff -        Anesthesiologist:  Joselito Francisco MD       CRNA: Sharonda Diallo APRN CRNA       Other Anesthesia Staff: Herrera Herrera       Performed By: CT  Consent for Airway        Urgency: elective  Indications and Patient Condition       Indications for airway management: enrico-procedural       Induction type:intravenous       Mask difficulty assessment: 2 - vent by mask + OA or adjuvant +/- NMBA    Final Airway Details       Final airway type: endotracheal airway       Successful airway: ETT - single and Oral  Endotracheal Airway Details        ETT size (mm): 7.5       Cuffed: yes       Successful intubation technique: direct laryngoscopy       DL Blade Type: Olvera 3       Grade View of Cords: 1       Adjucts: stylet       Position: Right       Measured from: gums/teeth       Secured at (cm): 24       Bite block used: None    Post intubation assessment        Placement verified by: capnometry, equal breath sounds and chest rise        Number of attempts at approach: 1       Number of other approaches attempted: 0       Secured with: silk tape       Ease of procedure: easy       Dentition: Unchanged and Intact       Dental guard used and removed. Dental Guard Type: Proguard Red.    Medication(s) Administered   Medication Administration Time: 6/15/2023 3:02 PM    Additional Comments       Vocal cords open and clear, atraumatic

## 2023-06-15 NOTE — H&P
General Surgery H&P  Conrad Carson MRN# [id number]   Age/Sex: 72 year old male YOB: 1950     Reason for visit:  Symptomatic cholelithiasis       Referring physician: Gael Staley MD                   Assessment and Plan:   Assessment:  1.  Symptomatic cholelithiasis  2.  History of A-fib  2.  History of anticoagulation on Xarelto    Plan:  -To the OR for robotic laparoscopic cholecystectomy  - The risks and benefits of the procedure were explained detail to the patient. The risks include infection, bleeding, damage to the surrounding structures. Patient verbalized understanding provided consent to undergo the procedure above.            Chief Complaint:   Here for surgery     History is obtained from the patient    HPI:   Conrad Carson is a 72 year old male who presents for cholecystectomy.  Patient has been off Xarelto since the weekend.  Patient has not had any acute changes to his medical history since last the last time that we have met.           Past Medical History:     Past Medical History:   Diagnosis Date     Antiplatelet or antithrombotic long-term use      Atrial fibrillation (H)      HLD (hyperlipidemia)      HTN (hypertension)      Irregular heart beat      Prostate cancer (H)      Sleep apnea      Status post catheter ablation of atrial fibrillation 03/27/2019    PVI Mar 27, 2019 (cryo-PVI + RA-CTI line)              Past Surgical History:     Past Surgical History:   Procedure Laterality Date     EP ABLATION AFLUTTER  03/27/2019    Procedure: EP Ablation Atrial Flutter;  Surgeon: Mayur Lu MD;  Location: [facility] Cath Lab;  Service: Cardiology     EP ABLATION PULMONARY VEIN ISOLATION       EP ABLATION PVI Left 03/27/2019    Procedure: EP Ablation PVI;  Surgeon: Mayur Lu MD;  Location: [facility] Cath Lab;  Service: Cardiology     EXCISE GANGLION WRIST Right     ring finger     HERNIA REPAIR       HERNIA REPAIR, UMBILICAL       [place] CARDIOVERSION ELECTIVE  ARRHYTHMIA EXTERNAL      10/01/2009, 3/3/18     PROSTATECTOMY  12/01/2013     TONSILLECTOMY               Social History:    reports that he has never smoked. He has never used smokeless tobacco. He reports current alcohol use of about 2.0 standard drinks of alcohol per week. He reports that he does not use drugs.           Family History:     Family History   Problem Relation Age of Onset     No Known Problems Mother      Cancer Father      Dyslipidemia Father      Aneurysm Father         ruputre aortic aneruysm     No Known Problems Sister      No Known Problems Brother               Allergies:   No Known Allergies           Medications:     Prior to Admission medications    Medication Sig Start Date End Date Taking? Authorizing Provider   cholecalciferol, vitamin D3, (VITAMIN D3) 1,000 unit capsule [CHOLECALCIFEROL, VITAMIN D3, (VITAMIN D3) 1,000 UNIT CAPSULE] 1,000 Units 2 (two) times a day. 2/11/15  Yes Provider, Historical   DOCOSAHEXANOIC ACID/EPA (FISH OIL ORAL) Take 1,200 mg by mouth 2 times daily 2/11/15  Yes Provider, Historical   multivitamin (MULTIVITAMIN) per tablet [MULTIVITAMIN (MULTIVITAMIN) PER TABLET] 1 tablet daily. 2/11/15  Yes Provider, Historical   amLODIPine (NORVASC) 10 MG tablet [AMLODIPINE (NORVASC) 10 MG TABLET] Take 10 mg by mouth daily. 5/14/20   Provider, Historical   atenolol (TENORMIN) 50 MG tablet [ATENOLOL (TENORMIN) 50 MG TABLET] Take 50 mg by mouth 2 (two) times a day. 3/14/16   Provider, Historical   atorvastatin (LIPITOR) 80 MG tablet Take 1 tablet by mouth At Bedtime 3/6/23   Reported, Patient   b complex vitamins capsule [B COMPLEX VITAMINS CAPSULE] 1 capsule daily. 2/11/15   Provider, Historical   cimetidine (TAGAMET) 800 MG tablet [CIMETIDINE (TAGAMET) 800 MG TABLET] 400 mg daily. Take as directed. 2/11/15   Provider, Historical   clotrimazole-betamethasone (LOTRISONE) 1-0.05 % external cream APPLY A THIN LAYER TOPICALLY TWICE DAILY 1/25/21   Reported, Patient   gemfibrozil  (LOPID) 600 MG tablet Take 600 mg by mouth 2 times daily 2/11/15   Provider, Historical   lisinopril-hydrochlorothiazide (ZESTORETIC) 20-25 MG tablet TAKE 1 TABLET BY MOUTH EVERY DAY 1/3/23   Dave Tamez MD   miscellaneous medical supply (MONOJECT SHARPS WALL CABINET) Misc [MISCELLANEOUS MEDICAL SUPPLY (MONOJECT SHARPS WALL CABINET) MISC] CPAP machine for home use at pressure: 9, nasal mask with cushion x 1, 6/3/19   Provider, Historical   niacin 500 MG tablet [NIACIN 500 MG TABLET] 500 mg daily. Take as directed. 2/11/15   Provider, Historical   omeprazole (PRILOSEC) 20 MG capsule [OMEPRAZOLE (PRILOSEC) 20 MG CAPSULE] 20 mg daily. 2/17/15   Provider, Historical   rivaroxaban ANTICOAGULANT (XARELTO ANTICOAGULANT) 20 MG TABS tablet Take 1 tablet (20 mg) by mouth daily (with dinner) -Needs to schedule cardiology follow-up appt for further refills 6/9/23   Dave Tamez MD   VIAGRA 100 mg tablet [VIAGRA 100 MG TABLET] AS DIRECTED 1/15/16   Provider, Historical              Review of Systems:   A 12 point Review of Systems is negative other than noted in the HPI            Physical Exam:   No data found.     No intake or output data in the 24 hours ending 06/15/23 0742   Constitutional:   awake, alert, cooperative, no apparent distress, and appears stated age       Eyes:   PERRL, conjunctiva/corneas clear, EOM's intact; no scleral edema or icterus noted        ENT:   Normocephalic, without obvious abnormality, atraumatic, Lips, mucosa, and tongue normal        Hematologic / Lymphatic:   No lymphadenopathy       Lungs:   Normal respiratory effort, no accessory muscle use       Cardiovascular:   Regular rate and rhythm       Abdomen:   Soft, nondistended, nontender to palpation       Musculoskeletal:   No obvious swelling, bruising or deformity       Skin:   Skin color and texture normal for patient, no rashes or lesions              Data:            Vernon Bhat DO  General Surgeon  [facility]   Surgery Clinic - [address]  [place] [address]?  Office: [phone]  Employed by - [facility]  Pager: [phone]

## 2023-06-15 NOTE — ANESTHESIA PREPROCEDURE EVALUATION
Anesthesia Pre-Procedure Evaluation    Patient: Conrad Carson   MRN: [id number] : 1950        Procedure : Procedure(s):  CHOLECYSTECTOMY, ROBOT-ASSISTED, LAPAROSCOPIC, USING DA LETY XI          Past Medical History:   Diagnosis Date     Antiplatelet or antithrombotic long-term use      Atrial fibrillation (H)      HLD (hyperlipidemia)      HTN (hypertension)      Irregular heart beat      Prostate cancer (H)      Sleep apnea      Status post catheter ablation of atrial fibrillation 2019    PVI Mar 27, 2019 (cryo-PVI + RA-CTI line)      Past Surgical History:   Procedure Laterality Date     EP ABLATION AFLUTTER  2019    Procedure: EP Ablation Atrial Flutter;  Surgeon: Mayur Lu MD;  Location: [facility] Cath Lab;  Service: Cardiology     EP ABLATION PULMONARY VEIN ISOLATION       EP ABLATION PVI Left 2019    Procedure: EP Ablation PVI;  Surgeon: Mayur Lu MD;  Location: [facility] Cath Lab;  Service: Cardiology     EXCISE GANGLION WRIST Right     ring finger     HERNIA REPAIR       HERNIA REPAIR, UMBILICAL       [place] CARDIOVERSION ELECTIVE ARRHYTHMIA EXTERNAL      10/01/2009, 3/3/18     PROSTATECTOMY  2013     TONSILLECTOMY        No Known Allergies   Social History     Tobacco Use     Smoking status: Never     Smokeless tobacco: Never   Vaping Use     Vaping status: Not on file   Substance Use Topics     Alcohol use: Yes     Alcohol/week: 2.0 standard drinks of alcohol     Types: 2 Standard drinks or equivalent per week     Comment: 1 day a week      Wt Readings from Last 1 Encounters:   06/15/23 97.7 kg (215 lb 4.8 oz)        Anesthesia Evaluation   Pt has had prior anesthetic.     No history of anesthetic complications       ROS/MED HX  ENT/Pulmonary:     (+) sleep apnea, uses CPAP,     Neurologic:       Cardiovascular:     (+) Dyslipidemia hypertension-----Taking blood thinners Instructions Given to patient: Last dose of xarelto on . dysrhythmias (s/p  ablation ), a-fib,     METS/Exercise Tolerance:     Hematologic:  - neg hematologic  ROS     Musculoskeletal:  - neg musculoskeletal ROS     GI/Hepatic:     (+) GERD, Asymptomatic on medication,     Renal/Genitourinary:  - neg Renal ROS     Endo:     (+) Obesity,     Psychiatric/Substance Use:  - neg psychiatric ROS     Infectious Disease:  - neg infectious disease ROS     Malignancy: Comment: Hx of prostate cancer s/p prostatectomy       Other:  - neg other ROS          Physical Exam    Airway  airway exam normal      Mallampati: II   TM distance: > 3 FB   Neck ROM: full   Mouth opening: > 3 cm    Respiratory Devices and Support         Dental       (+) Minor Abnormalities - some fillings, tiny chips      Cardiovascular   cardiovascular exam normal       Rhythm and rate: regular and normal     Pulmonary   pulmonary exam normal        breath sounds clear to auscultation           OUTSIDE LABS:  CBC:   Lab Results   Component Value Date    WBC 8.4 12/07/2020    WBC 6.2 03/27/2019    HGB 15.4 12/07/2020    HGB 14.5 03/27/2019    HCT 43.8 12/07/2020    HCT 42.2 03/27/2019     12/07/2020     03/27/2019     BMP:   Lab Results   Component Value Date     05/16/2023     01/25/2023    POTASSIUM 4.2 05/16/2023    POTASSIUM 3.8 01/25/2023    CHLORIDE 103 05/16/2023    CHLORIDE 106 01/25/2023    CO2 24 05/16/2023    CO2 23 01/25/2023    BUN 11.9 05/16/2023    BUN 17.9 01/25/2023    CR 1.05 05/16/2023    CR 1.26 (H) 01/25/2023     (H) 05/16/2023     (H) 01/25/2023     COAGS:   Lab Results   Component Value Date    PTT 32 03/03/2018    INR 1.03 03/03/2018     POC: No results found for: BGM, HCG, HCGS  HEPATIC:   Lab Results   Component Value Date    ALBUMIN 4.7 04/19/2023    PROTTOTAL 7.4 04/19/2023    ALT 24 04/19/2023    AST 20 04/19/2023    ALKPHOS 89 04/19/2023    BILITOTAL 0.9 04/19/2023     OTHER:   Lab Results   Component Value Date    PH 7.38 03/27/2019    ISAAC 10.0 05/16/2023    MAG  2.2 03/03/2018    LIPASE 40 04/19/2023    TSH 1.47 02/14/2019       Anesthesia Plan    ASA Status:  3   NPO Status:  NPO Appropriate    Anesthesia Type: General.     - Airway: ETT   Induction: Intravenous, Propofol.   Maintenance: Balanced.        Consents    Anesthesia Plan(s) and associated risks, benefits, and realistic alternatives discussed. Questions answered and patient/representative(s) expressed understanding.    - Discussed:     - Discussed with:  Patient         Postoperative Care    Pain management: IV analgesics, Oral pain medications, Multi-modal analgesia.   PONV prophylaxis: Ondansetron (or other 5HT-3), Dexamethasone or Solumedrol, Droperidol or Haldol     Comments:                MOMO STARR MD

## 2023-06-15 NOTE — OP NOTE
[facility]    Operative Note    Pre-operative diagnosis: Gallstones [K80.20]  Symptomatic cholelithiasis [K80.20]  Post-operative diagnosis Same as pre-operative diagnosis    Procedure: Procedure(s):  CHOLECYSTECTOMY, ROBOT-ASSISTED, LAPAROSCOPIC, USING DA LETY XI, LYSIS OF ADHESIONS  Surgeon: Surgeon(s) and Role:     * Vernon Bhat DO - Primary  Anesthesia: General   Estimated Blood Loss: 5 mL    Drains: None  Specimens:   ID Type Source Tests Collected by Time Destination   1 : GALLBLADDER Tissue Gallbladder SURGICAL PATHOLOGY EXAM Vernon Bhat DO 6/15/2023  3:53 PM      Findings:    -  Distended gallbladder with gallstones.    Complications: None.  Implants: * No implants in log *    Indication: 73 yo male presenting with abdominal pain and diagnosed with symptomatic gallstones.  Patient also has history of anticoagulation.  I offered the patient a procedure of robotic assisted cholecystectomy.  The risks and benefits of the procedure were explained detail to the patient. The risks include infection, bleeding, damage to the surrounding structures. Patient verbalized understanding provided consent to undergo the procedure above.    Procedure: Patient was brought back to the operating room and was placed on the operating table in the supine position.  The patient's extremities were padded and positioned in the usual fashion.  The patient then underwent anesthesia sedation and intubation.  The patient's abdomen was prepped and draped in the usual sterile fashion.  Prior to initiating the procedure, a timeout was completed.  All present were in agreement.    1% lidocaine with epinephrine was instilled at Bonilla's point in the left upper quadrant.  A 15 blade was used to make a skin knick incision at Bonilla's point.  A Veress needle was inserted into the abdomen and insufflation was initiated. An 8 mm trocar was inserted at the infraumbilical port site.  Insufflation was then initiated.   Once insufflation was completed, a general survey was completed.  I could identify that the patient had no injury of the abdominal contents upon entering the abdomen.  The gallbladder was distended.    An 8 mm port was placed to the right of the umbilicus.  Two 8 mm ports were placed in the left abdominal quadrant.  These ports were placed under direct visualization.  The robot was then docked and the robotic instruments were then inserted into the abdomen under direct visualization.  The gallbladder was then grasped with tip up graspers and retracted cephalad.  The cystic duct and cystic artery were then carefully dissected and isolated with a combination of blunt dissection with the hook electrocautery.  Once the cystic artery and cystic duct were isolated the critical view was obtained.  Weck clips were used to clip across the cystic artery and duct.  The hook cautery was used to transect between the Weck clips of the cystic duct and cystic artery.  The gallbladder was removed off of the liver bed using electrocautery.  The gallbladder was then removed from the abdomen using an Endo Catch bag through the 8 mm left lower quadrant port.  On palpation of the gallbladder, I could identify that the patient had moderate size gallstones.    Inspection of the liver bed revealed good hemostasis.  The fascia of the gallbladder extraction site was then closed using an 0 Vicryl suture with a suture passer.  A 3-0 Vicryl suture was used to perform deep dermal sutures at the 12 mm port site.  All surgical sites were then closed with a 4-0 Vicryl suture in a subcuticular fashion.  Surgical glue was used to reinforce all surgical skin incisions.  At the end of the procedure, a final count was completed.  I was told all sharps, sponges, instruments were accounted for.  The patient tolerated the procedure with no complications.  The patient was then extubated and brought back to the PACU in stable condition.            Vernon Bhat  DO  General Surgeon  [facility]  Surgery [facility] - [address]  [place] [address]?  Office: [phone]  Employed by - [facility]  Pager: [phone]

## 2023-06-15 NOTE — DISCHARGE INSTRUCTIONS
Okay to restart anticoagulation on 6/17/2023.    Follow up: please call us at [phone] to schedule an appointment at your convenience.  If you would prefer to follow up with us by phone please let us know so that we may contact you 2-3 weeks following your procedure.         Diet: Regular diet. Patients can have difficulty with constipation following surgery, due in part to the administration of narcotic medications.  If you are suffering with constipation, you should avoid foods such as hard cheeses or red meat.  Foods high infiber are recommended.       Activity: You should continue to be active at home, including ambulating frequently.  If possible try to limit the amount of time spent in bed.     Restrictions: You have no liftingrestrictions post operatively, but may wish to avoid strenuous physical activity for 1-2 weeks.  You should limit your physical activity if it causes you discomfort; however, this should resolve within 1-2 weeks.   Walking does not count as strenuous physical activity.  You are safe to walk up and down stairs.  Following 2 weeks you may resume all normal physical activity.     Wound / drain care: Your incisions are closed using absorbale sutures.  The skin is sealed with a surgical glue.  Do not peal the glue off.  Please allow the glue to peal off on its own.      It is normal to have a smallrim of red present around the incisions. This should not, however, extend beyond 1/4 inch from the incision.  If your incisions become increasingly tender, red, or draining, please contact us.        Bathing: Youmay shower after 24 hours from surgery.  It is ok to get your incisions wet, but avoid rubbing them.  Avoid soaking in bath tubs, or swimming in lakes, pools, or streams for 4 weeks following surgery.

## 2023-06-15 NOTE — ANESTHESIA CARE TRANSFER NOTE
Patient: Conrad Carson    Procedure: Procedure(s):  CHOLECYSTECTOMY, ROBOT-ASSISTED, LAPAROSCOPIC, USING DA LETY XI, LYSIS OF ADHESIONS       Diagnosis: Gallstones [K80.20]  Symptomatic cholelithiasis [K80.20]  Diagnosis Additional Information: No value filed.    Anesthesia Type:   General     Note:    Oropharynx: oropharynx clear of all foreign objects  Level of Consciousness: awake and drowsy  Oxygen Supplementation: face mask  Level of Supplemental Oxygen (L/min / FiO2): 8  Independent Airway: airway patency satisfactory and stable  Dentition: dentition unchanged  Vital Signs Stable: post-procedure vital signs reviewed and stable  Report to RN Given: handoff report given  Patient transferred to: PACU    Handoff Report: Identifed the Patient, Identified the Reponsible Provider, Reviewed the pertinent medical history, Discussed the surgical course, Reviewed Intra-OP anesthesia mangement and issues during anesthesia, Set expectations for post-procedure period and Allowed opportunity for questions and acknowledgement of understanding      Vitals:  Vitals Value Taken Time   /72 06/15/23 1620   Temp 36.6  C (97.9  F) 06/15/23 1620   Pulse 73 06/15/23 1621   Resp 16 06/15/23 1621   SpO2 96 % 06/15/23 1621   Vitals shown include unvalidated device data.    Electronically Signed By: NEO Fine CRNA  Abida 15, 2023  4:22 PM

## 2023-06-16 NOTE — ANESTHESIA POSTPROCEDURE EVALUATION
Patient: Conrad Carson    Procedure: Procedure(s):  CHOLECYSTECTOMY, ROBOT-ASSISTED, LAPAROSCOPIC, USING DA LETY XI, LYSIS OF ADHESIONS       Anesthesia Type:  General    Note:  Disposition: Outpatient   Postop Pain Control: Uneventful            Sign Out: Well controlled pain   PONV: No   Neuro/Psych: Uneventful            Sign Out: Acceptable/Baseline neuro status   Airway/Respiratory: Uneventful            Sign Out: Acceptable/Baseline resp. status   CV/Hemodynamics: Uneventful            Sign Out: Acceptable CV status; No obvious hypovolemia; No obvious fluid overload   Other NRE: NONE   DID A NON-ROUTINE EVENT OCCUR?            Last vitals:  Vitals Value Taken Time   /63 06/15/23 1700   Temp 36.6  C (97.9  F) 06/15/23 1620   Pulse 74 06/15/23 1707   Resp 14 06/15/23 1707   SpO2 93 % 06/15/23 1707   Vitals shown include unvalidated device data.    Electronically Signed By: Talib Manriquez MD  Abida 15, 2023  7:30 PM

## 2023-06-19 LAB
PATH REPORT.COMMENTS IMP SPEC: NORMAL
PATH REPORT.COMMENTS IMP SPEC: NORMAL
PATH REPORT.FINAL DX SPEC: NORMAL
PATH REPORT.GROSS SPEC: NORMAL
PATH REPORT.MICROSCOPIC SPEC OTHER STN: NORMAL
PATH REPORT.RELEVANT HX SPEC: NORMAL
PHOTO IMAGE: NORMAL

## 2023-08-01 ENCOUNTER — TRANSFERRED RECORDS (OUTPATIENT)
Dept: HEALTH INFORMATION MANAGEMENT | Facility: CLINIC | Age: 73
End: 2023-08-01

## 2023-09-08 ENCOUNTER — OFFICE VISIT (OUTPATIENT)
Dept: CARDIOLOGY | Facility: CLINIC | Age: 73
End: 2023-09-08
Payer: COMMERCIAL

## 2023-09-08 VITALS
HEART RATE: 60 BPM | SYSTOLIC BLOOD PRESSURE: 132 MMHG | HEIGHT: 68 IN | BODY MASS INDEX: 32.99 KG/M2 | RESPIRATION RATE: 16 BRPM | DIASTOLIC BLOOD PRESSURE: 58 MMHG | WEIGHT: 217.7 LBS

## 2023-09-08 DIAGNOSIS — G47.33 OBSTRUCTIVE SLEEP APNEA SYNDROME: ICD-10-CM

## 2023-09-08 DIAGNOSIS — E78.5 HYPERLIPIDEMIA LDL GOAL <100: Primary | ICD-10-CM

## 2023-09-08 DIAGNOSIS — Z98.890 STATUS POST CATHETER ABLATION OF ATRIAL FIBRILLATION: ICD-10-CM

## 2023-09-08 DIAGNOSIS — I48.0 PAROXYSMAL ATRIAL FIBRILLATION (H): ICD-10-CM

## 2023-09-08 DIAGNOSIS — I10 ESSENTIAL HYPERTENSION: ICD-10-CM

## 2023-09-08 PROCEDURE — 99213 OFFICE O/P EST LOW 20 MIN: CPT | Mod: 24 | Performed by: INTERNAL MEDICINE

## 2023-09-08 NOTE — PROGRESS NOTES
[facility]  [phone]          Assessment/Recommendations   Patient with known history of hypertension as well as atrial fibrillation.  He had pulmonary vein isolation procedure and only 1 clinical episode of A-fib thereafter.  He has been stable this past year.  Blood pressure is at goal, his LDL cholesterol is well treated, but he is not exercising a great deal.    I have encouraged him to walk on a regular basis shooting for 20 to 30 minutes and at least 5 times a week.  He will take that under advisement.    We talked in the past about considering coming off of Xarelto.  Recommended he consider a wearable device such as a garment or Apple Watch and would likely request a 2-week monitor to ensure he is not having any asymptomatic atrial fibrillation.  Patient currently is not interested in either of those and we will set him up for a follow-up visit with the A-fib clinic in 4 to 5 months and they can discuss it with him as well.    We will see him back in 1 year, but of course would be happy to see him sooner if questions or problems arise.       History of Present Illness/Subjective    Mr. Conrad Carson is a 73 year old male with known history of atrial fibrillation, status post pulmonary vein isolation.  He has had no clinical palpitations, lightheadedness or syncopal episodes this past year.  No chest discomfort, orthopnea, paroxysmal nocturnal dyspnea and has trivial peripheral edema which she says is quite stable.  He did have his gallbladder removed and this resolved his abdominal discomfort.    He continues on Xarelto.  He does not have a wearable device to detect atrial fibrillation and is not particularly interested in it at this time.    ECG: Personally reviewed.  May 16, 2023 shows sinus rhythm at 60 bpm.  ECG is normal.       Physical Examination Review of Systems   /58 (BP Location: Left arm, Patient Position: Sitting, Cuff Size: Adult Large)   Pulse 60   Resp  "16   Ht 1.727 m (5' 8\")   Wt 98.7 kg (217 lb 11.2 oz)   BMI 33.10 kg/m    Body mass index is 33.1 kg/m .  Wt Readings from Last 3 Encounters:   09/08/23 98.7 kg (217 lb 11.2 oz)   06/15/23 97.7 kg (215 lb 4.8 oz)   05/09/23 97.1 kg (214 lb)     General Appearance:   Alert, cooperative and in no acute distress.   ENT/Mouth: Pink/moist oral mucosa   EYES:  no scleral icterus, normal conjunctivae   Neck: JVP normal. No Hepatojugular reflux. Thyroid not visualized.   Chest/Lungs:   Lungs are clear to auscultation, equal chest wall expansion.   Cardiovascular:   S1, S2 without murmur ,clicks or rubs. Brachial, radial  pulses are intact and symetric. No carotid bruits noted   Abdomen:  Nontender. BS+. No bruits.   Extremities: No cyanosis, clubbing or edema   Skin: no xanthelasma, warm.    Neurologic: normal arm movement bilateral, no tremors     Psychiatric: Appropriate affect.      Enc Vitals  BP: 132/58  Pulse: 60  Resp: 16  Weight: 98.7 kg (217 lb 11.2 oz)  Height: 172.7 cm (5' 8\")                                           Medical History  Surgical History Family History Social History   Past Medical History:   Diagnosis Date    Antiplatelet or antithrombotic long-term use     Atrial fibrillation (H)     HLD (hyperlipidemia)     HTN (hypertension)     Irregular heart beat     Prostate cancer (H)     Sleep apnea     Status post catheter ablation of atrial fibrillation 03/27/2019    PVI Mar 27, 2019 (cryo-PVI + RA-CTI line)    Past Surgical History:   Procedure Laterality Date    EP ABLATION AFLUTTER  03/27/2019    Procedure: EP Ablation Atrial Flutter;  Surgeon: Mayur Lu MD;  Location: [facility] Lab;  Service: Cardiology    EP ABLATION PULMONARY VEIN ISOLATION      EP ABLATION PVI Left 03/27/2019    Procedure: EP Ablation PVI;  Surgeon: Mayur Lu MD;  Location: [facility] Lab;  Service: Cardiology    EXCISE GANGLION WRIST Right     ring finger    HERNIA REPAIR      HERNIA REPAIR, " UMBILICAL      LAPAROSCOPIC CHOLECYSTECTOMY N/A 6/15/2023    Procedure: CHOLECYSTECTOMY, ROBOT-ASSISTED, LAPAROSCOPIC, USING DA LETY XI, LYSIS OF ADHESIONS;  Surgeon: Vernon Bhat DO;  Location: [facility] OR    [place] CARDIOVERSION ELECTIVE ARRHYTHMIA EXTERNAL      10/01/2009, 3/3/18    PROSTATECTOMY  12/01/2013    TONSILLECTOMY      Family History   Problem Relation Age of Onset    No Known Problems Mother     Cancer Father     Dyslipidemia Father     Aneurysm Father         ruputre aortic aneruysm    No Known Problems Sister     No Known Problems Brother     Social History     Socioeconomic History    Marital status:      Spouse name: Not on file    Number of children: Not on file    Years of education: Not on file    Highest education level: Not on file   Occupational History    Not on file   Tobacco Use    Smoking status: Never    Smokeless tobacco: Never   Vaping Use    Vaping Use: Never used   Substance and Sexual Activity    Alcohol use: Yes     Alcohol/week: 2.0 standard drinks of alcohol     Types: 2 Standard drinks or equivalent per week     Comment: 1 day a week    Drug use: No    Sexual activity: Not on file   Other Topics Concern    Not on file   Social History Narrative    Not on file     Social Determinants of Health     Financial Resource Strain: Not on file   Food Insecurity: Not on file   Transportation Needs: Not on file   Physical Activity: Not on file   Stress: Not on file   Social Connections: Not on file   Intimate Partner Violence: Not on file   Housing Stability: Not on file          Medications  Allergies   Current Outpatient Medications   Medication Sig Dispense Refill    amLODIPine (NORVASC) 10 MG tablet [AMLODIPINE (NORVASC) 10 MG TABLET] Take 10 mg by mouth daily.      atenolol (TENORMIN) 50 MG tablet [ATENOLOL (TENORMIN) 50 MG TABLET] Take 50 mg by mouth 2 (two) times a day.      atorvastatin (LIPITOR) 80 MG tablet Take 1 tablet by mouth At Bedtime      b complex vitamins  capsule [B COMPLEX VITAMINS CAPSULE] 1 capsule daily.      cholecalciferol, vitamin D3, (VITAMIN D3) 1,000 unit capsule Take 1,000 Units by mouth daily      cimetidine (TAGAMET) 800 MG tablet [CIMETIDINE (TAGAMET) 800 MG TABLET] 400 mg daily. Take as directed.      clotrimazole-betamethasone (LOTRISONE) 1-0.05 % external cream APPLY A THIN LAYER TOPICALLY TWICE DAILY      DOCOSAHEXANOIC ACID/EPA (FISH OIL ORAL) Take 1,200 mg by mouth 2 times daily      gemfibrozil (LOPID) 600 MG tablet Take 600 mg by mouth 2 times daily      lisinopril-hydrochlorothiazide (ZESTORETIC) 20-25 MG tablet TAKE 1 TABLET BY MOUTH EVERY DAY 90 tablet 2    multivitamin (MULTIVITAMIN) per tablet [MULTIVITAMIN (MULTIVITAMIN) PER TABLET] 1 tablet daily.      niacin 500 MG tablet [NIACIN 500 MG TABLET] 500 mg daily. Take as directed.      NONFORMULARY CPAP machine for home use at pressure 9, nasal mask  x1/3month with nasal pillows x 2/mo, lifetime length of need, daily use      omeprazole (PRILOSEC) 20 MG capsule [OMEPRAZOLE (PRILOSEC) 20 MG CAPSULE] 20 mg daily.      rivaroxaban ANTICOAGULANT (XARELTO ANTICOAGULANT) 20 MG TABS tablet Take 1 tablet (20 mg) by mouth daily (with dinner) -Needs to schedule cardiology follow-up appt for further refills 90 tablet 0    No Known Allergies      Lab Results    Chemistry/lipid CBC Cardiac Enzymes/BNP/TSH/INR   Lab Results   Component Value Date    CHOL 141 01/25/2023    HDL 30 (L) 01/25/2023    TRIG 120 01/25/2023    BUN 11.9 05/16/2023     05/16/2023    CO2 24 05/16/2023    Lab Results   Component Value Date    WBC 8.4 12/07/2020    HGB 15.4 12/07/2020    HCT 43.8 12/07/2020    MCV 90 12/07/2020     12/07/2020    Lab Results   Component Value Date    TROPONINI <0.01 09/17/2018    TSH 1.47 02/14/2019    INR 1.03 03/03/2018

## 2023-09-08 NOTE — LETTER
9/8/2023    Gael Staley MD  [address] [place] [address]    RE: Conrad Carson       Dear Colleague,     I had the pleasure of seeing Conrad Carson in the [facility].      [facility]  [phone]          Assessment/Recommendations   Patient with known history of hypertension as well as atrial fibrillation.  He had pulmonary vein isolation procedure and only 1 clinical episode of A-fib thereafter.  He has been stable this past year.  Blood pressure is at goal, his LDL cholesterol is well treated, but he is not exercising a great deal.    I have encouraged him to walk on a regular basis shooting for 20 to 30 minutes and at least 5 times a week.  He will take that under advisement.    We talked in the past about considering coming off of Xarelto.  Recommended he consider a wearable device such as a garment or Apple Watch and would likely request a 2-week monitor to ensure he is not having any asymptomatic atrial fibrillation.  Patient currently is not interested in either of those and we will set him up for a follow-up visit with the A-fib clinic in 4 to 5 months and they can discuss it with him as well.    We will see him back in 1 year, but of course would be happy to see him sooner if questions or problems arise.       History of Present Illness/Subjective    Mr. Conrad Carson is a 73 year old male with known history of atrial fibrillation, status post pulmonary vein isolation.  He has had no clinical palpitations, lightheadedness or syncopal episodes this past year.  No chest discomfort, orthopnea, paroxysmal nocturnal dyspnea and has trivial peripheral edema which she says is quite stable.  He did have his gallbladder removed and this resolved his abdominal discomfort.    He continues on Xarelto.  He does not have a wearable device to detect atrial fibrillation and is not particularly interested in it at this time.    ECG: Personally reviewed.  May  "16, 2023 shows sinus rhythm at 60 bpm.  ECG is normal.       Physical Examination Review of Systems   /58 (BP Location: Left arm, Patient Position: Sitting, Cuff Size: Adult Large)   Pulse 60   Resp 16   Ht 1.727 m (5' 8\")   Wt 98.7 kg (217 lb 11.2 oz)   BMI 33.10 kg/m    Body mass index is 33.1 kg/m .  Wt Readings from Last 3 Encounters:   09/08/23 98.7 kg (217 lb 11.2 oz)   06/15/23 97.7 kg (215 lb 4.8 oz)   05/09/23 97.1 kg (214 lb)     General Appearance:   Alert, cooperative and in no acute distress.   ENT/Mouth: Pink/moist oral mucosa   EYES:  no scleral icterus, normal conjunctivae   Neck: JVP normal. No Hepatojugular reflux. Thyroid not visualized.   Chest/Lungs:   Lungs are clear to auscultation, equal chest wall expansion.   Cardiovascular:   S1, S2 without murmur ,clicks or rubs. Brachial, radial  pulses are intact and symetric. No carotid bruits noted   Abdomen:  Nontender. BS+. No bruits.   Extremities: No cyanosis, clubbing or edema   Skin: no xanthelasma, warm.    Neurologic: normal arm movement bilateral, no tremors     Psychiatric: Appropriate affect.      Enc Vitals  BP: 132/58  Pulse: 60  Resp: 16  Weight: 98.7 kg (217 lb 11.2 oz)  Height: 172.7 cm (5' 8\")                                           Medical History  Surgical History Family History Social History   Past Medical History:   Diagnosis Date    Antiplatelet or antithrombotic long-term use     Atrial fibrillation (H)     HLD (hyperlipidemia)     HTN (hypertension)     Irregular heart beat     Prostate cancer (H)     Sleep apnea     Status post catheter ablation of atrial fibrillation 03/27/2019    PVI Mar 27, 2019 (cryo-PVI + RA-CTI line)    Past Surgical History:   Procedure Laterality Date    EP ABLATION AFLUTTER  03/27/2019    Procedure: EP Ablation Atrial Flutter;  Surgeon: Mayur Lu MD;  Location: [facility] Cath Lab;  Service: Cardiology    EP ABLATION PULMONARY VEIN ISOLATION      EP ABLATION PVI Left 03/27/2019 "    Procedure: EP Ablation PVI;  Surgeon: Mayur Lu MD;  Location: [facility] Cath Lab;  Service: Cardiology    EXCISE GANGLION WRIST Right     ring finger    HERNIA REPAIR      HERNIA REPAIR, UMBILICAL      LAPAROSCOPIC CHOLECYSTECTOMY N/A 6/15/2023    Procedure: CHOLECYSTECTOMY, ROBOT-ASSISTED, LAPAROSCOPIC, USING DA LETY XI, LYSIS OF ADHESIONS;  Surgeon: Vernon Bhat DO;  Location: [facility] OR    [place] CARDIOVERSION ELECTIVE ARRHYTHMIA EXTERNAL      10/01/2009, 3/3/18    PROSTATECTOMY  12/01/2013    TONSILLECTOMY      Family History   Problem Relation Age of Onset    No Known Problems Mother     Cancer Father     Dyslipidemia Father     Aneurysm Father         ruputre aortic aneruysm    No Known Problems Sister     No Known Problems Brother     Social History     Socioeconomic History    Marital status:      Spouse name: Not on file    Number of children: Not on file    Years of education: Not on file    Highest education level: Not on file   Occupational History    Not on file   Tobacco Use    Smoking status: Never    Smokeless tobacco: Never   Vaping Use    Vaping Use: Never used   Substance and Sexual Activity    Alcohol use: Yes     Alcohol/week: 2.0 standard drinks of alcohol     Types: 2 Standard drinks or equivalent per week     Comment: 1 day a week    Drug use: No    Sexual activity: Not on file   Other Topics Concern    Not on file   Social History Narrative    Not on file     Social Determinants of Health     Financial Resource Strain: Not on file   Food Insecurity: Not on file   Transportation Needs: Not on file   Physical Activity: Not on file   Stress: Not on file   Social Connections: Not on file   Intimate Partner Violence: Not on file   Housing Stability: Not on file          Medications  Allergies   Current Outpatient Medications   Medication Sig Dispense Refill    amLODIPine (NORVASC) 10 MG tablet [AMLODIPINE (NORVASC) 10 MG TABLET] Take 10 mg by mouth daily.      atenolol  (TENORMIN) 50 MG tablet [ATENOLOL (TENORMIN) 50 MG TABLET] Take 50 mg by mouth 2 (two) times a day.      atorvastatin (LIPITOR) 80 MG tablet Take 1 tablet by mouth At Bedtime      b complex vitamins capsule [B COMPLEX VITAMINS CAPSULE] 1 capsule daily.      cholecalciferol, vitamin D3, (VITAMIN D3) 1,000 unit capsule Take 1,000 Units by mouth daily      cimetidine (TAGAMET) 800 MG tablet [CIMETIDINE (TAGAMET) 800 MG TABLET] 400 mg daily. Take as directed.      clotrimazole-betamethasone (LOTRISONE) 1-0.05 % external cream APPLY A THIN LAYER TOPICALLY TWICE DAILY      DOCOSAHEXANOIC ACID/EPA (FISH OIL ORAL) Take 1,200 mg by mouth 2 times daily      gemfibrozil (LOPID) 600 MG tablet Take 600 mg by mouth 2 times daily      lisinopril-hydrochlorothiazide (ZESTORETIC) 20-25 MG tablet TAKE 1 TABLET BY MOUTH EVERY DAY 90 tablet 2    multivitamin (MULTIVITAMIN) per tablet [MULTIVITAMIN (MULTIVITAMIN) PER TABLET] 1 tablet daily.      niacin 500 MG tablet [NIACIN 500 MG TABLET] 500 mg daily. Take as directed.      NONFORMULARY CPAP machine for home use at pressure 9, nasal mask  x1/3month with nasal pillows x 2/mo, lifetime length of need, daily use      omeprazole (PRILOSEC) 20 MG capsule [OMEPRAZOLE (PRILOSEC) 20 MG CAPSULE] 20 mg daily.      rivaroxaban ANTICOAGULANT (XARELTO ANTICOAGULANT) 20 MG TABS tablet Take 1 tablet (20 mg) by mouth daily (with dinner) -Needs to schedule cardiology follow-up appt for further refills 90 tablet 0    No Known Allergies      Lab Results    Chemistry/lipid CBC Cardiac Enzymes/BNP/TSH/INR   Lab Results   Component Value Date    CHOL 141 01/25/2023    HDL 30 (L) 01/25/2023    TRIG 120 01/25/2023    BUN 11.9 05/16/2023     05/16/2023    CO2 24 05/16/2023    Lab Results   Component Value Date    WBC 8.4 12/07/2020    HGB 15.4 12/07/2020    HCT 43.8 12/07/2020    MCV 90 12/07/2020     12/07/2020    Lab Results   Component Value Date    TROPONINI <0.01 09/17/2018    TSH 1.47  02/14/2019    INR 1.03 03/03/2018                Thank you for allowing me to participate in the care of your patient.      Sincerely,     Dave Tamez MD     [facility]  cc:   Dave Tamez MD  [address]  [place] [address]

## 2023-10-09 DIAGNOSIS — I10 ESSENTIAL HYPERTENSION: ICD-10-CM

## 2023-10-09 RX ORDER — LISINOPRIL AND HYDROCHLOROTHIAZIDE 20; 25 MG/1; MG/1
TABLET ORAL
Qty: 90 TABLET | Refills: 3 | Status: SHIPPED | OUTPATIENT
Start: 2023-10-09

## 2024-01-30 ENCOUNTER — LAB REQUISITION (OUTPATIENT)
Dept: LAB | Facility: CLINIC | Age: 74
End: 2024-01-30

## 2024-01-30 ENCOUNTER — TRANSFERRED RECORDS (OUTPATIENT)
Dept: HEALTH INFORMATION MANAGEMENT | Facility: CLINIC | Age: 74
End: 2024-01-30

## 2024-01-30 DIAGNOSIS — I10 ESSENTIAL (PRIMARY) HYPERTENSION: ICD-10-CM

## 2024-01-30 DIAGNOSIS — E78.5 HYPERLIPIDEMIA, UNSPECIFIED: ICD-10-CM

## 2024-01-30 LAB
ALT SERPL-CCNC: 29 U/L (ref 0–70)
AST SERPL-CCNC: 24 U/L (ref 0–45)
CREATININE (EXTERNAL): 1.24 MG/DL (ref 0.64–1.17)
GLUCOSE (EXTERNAL): 147 MG/DL (ref 70–99)
POTASSIUM (EXTERNAL): 4 MMOL/L (ref 3.4–5.3)

## 2024-01-30 PROCEDURE — 82040 ASSAY OF SERUM ALBUMIN: CPT | Performed by: FAMILY MEDICINE

## 2024-01-30 PROCEDURE — 80061 LIPID PANEL: CPT | Performed by: FAMILY MEDICINE

## 2024-01-31 LAB
ALBUMIN SERPL BCG-MCNC: 4.3 G/DL (ref 3.5–5.2)
ALP SERPL-CCNC: 89 U/L (ref 40–150)
ALT SERPL W P-5'-P-CCNC: 29 U/L (ref 0–70)
ANION GAP SERPL CALCULATED.3IONS-SCNC: 11 MMOL/L (ref 7–15)
AST SERPL W P-5'-P-CCNC: 24 U/L (ref 0–45)
BILIRUB SERPL-MCNC: 0.7 MG/DL
BUN SERPL-MCNC: 15.5 MG/DL (ref 8–23)
CALCIUM SERPL-MCNC: 9.7 MG/DL (ref 8.8–10.2)
CHLORIDE SERPL-SCNC: 104 MMOL/L (ref 98–107)
CHOLEST SERPL-MCNC: 142 MG/DL
CREAT SERPL-MCNC: 1.24 MG/DL (ref 0.67–1.17)
DEPRECATED HCO3 PLAS-SCNC: 27 MMOL/L (ref 22–29)
EGFRCR SERPLBLD CKD-EPI 2021: 61 ML/MIN/1.73M2
FASTING STATUS PATIENT QL REPORTED: ABNORMAL
GLUCOSE SERPL-MCNC: 147 MG/DL (ref 70–99)
HDLC SERPL-MCNC: 30 MG/DL
LDLC SERPL CALC-MCNC: 88 MG/DL
NONHDLC SERPL-MCNC: 112 MG/DL
POTASSIUM SERPL-SCNC: 4 MMOL/L (ref 3.4–5.3)
PROT SERPL-MCNC: 7.2 G/DL (ref 6.4–8.3)
SODIUM SERPL-SCNC: 142 MMOL/L (ref 135–145)
TRIGL SERPL-MCNC: 119 MG/DL

## 2024-02-26 DIAGNOSIS — I48.0 PAROXYSMAL ATRIAL FIBRILLATION (H): ICD-10-CM

## 2024-02-26 RX ORDER — RIVAROXABAN 20 MG/1
TABLET, FILM COATED ORAL
Qty: 90 TABLET | Refills: 0 | Status: SHIPPED | OUTPATIENT
Start: 2024-02-26 | End: 2024-03-28

## 2024-03-27 NOTE — PROGRESS NOTES
"  HEART CARE ENCOUNTER CONSULTATON NOTE      [facility]  [phone]      Assessment/Recommendations   Assessment:   Paroxysmal atrial fibrillation: s/p PVI 3/2019 Xarelto, seems to have had likely recurrence of A-fib lasting about 12 to 16 hours 1 month ago sensation of \"knot in stomach\"and palpation of irregular pulse.  No sensation of fast heart rates although did not count pulse.  Continuous onset at rest and turn to NSR.  No recurrence since.  Hypertension: Controlled on Amlodipine 10 mg, atenolol 50 mg, lisinopril-HCTZ 20-25 mg   Hyperlipidemia: LDL 88 on Controlled on atorvastatin 80 mg  MARCELA: CPAP nightly    Plan:   Continue Xarelto, filled  Discussed monitoring for other recurrence, notify clinic.  Instructed to monitor heart rates in A-fib with goal less than 100 bpm.  Exercise and healthy diet  Continue CPAP nightly      Follow up 9/2023 for annual with Dr. Tamez or sooner as needed     History of Present Illness/Subjective    HPI: Conrad Carson is a 73 year old male with PMHx of paroxysmal A-fib, HTN, HLD, MARCELA presents for follow-up for medication refills.    Seems to have had likely recurrence of A-fib lasting about 12 to 16 hours 1 month ago sensation of \"knot in stomach\" and palpation of irregular pulse.  No sensation of fast heart rates although did not count pulse.  Continuous onset at rest and turn to NSR.  No recurrence since.  Recalls maybe 1 other recurrence since ablation 2019 maybe 2020 or 2021.  1 week prior experienced a slip and fall in his driveway and hit his head.  No residual side effects or injury while on Xarelto.  Is using CPAP nightly       Physical Examination  Review of Systems   Vitals: /70 (BP Location: Right arm, Patient Position: Sitting, Cuff Size: Adult Large)   Pulse 62   Resp 18   Wt 98 kg (216 lb)   SpO2 96%   BMI 32.84 kg/m    BMI= Body mass index is 32.84 kg/m .  Wt Readings from Last 3 Encounters:   03/28/24 98 kg (216 lb) "   09/08/23 98.7 kg (217 lb 11.2 oz)   06/15/23 97.7 kg (215 lb 4.8 oz)           ENT/Mouth: membranes moist, no oral lesions or bleeding gums.      EYES:  no scleral icterus, normal conjunctivae       Chest/Lungs:   lungs are clear to auscultation, no rales or wheezing,  equal chest wall expansion    Cardiovascular:   Regular. Normal first and second heart sounds with no murmurs, rubs, or gallops; the radial pulses are intact, trace edema bilaterally        Extremities: no cyanosis or clubbing   Skin: no xanthelasma, warm.    Neurologic:  no tremors     Psychiatric: alert and oriented x3, calm        Please refer above for cardiac ROS details.        Medical History  Surgical History Family History Social History   Past Medical History:   Diagnosis Date    Antiplatelet or antithrombotic long-term use     Atrial fibrillation (H)     HLD (hyperlipidemia)     HTN (hypertension)     Irregular heart beat     Prostate cancer (H)     Sleep apnea     Status post catheter ablation of atrial fibrillation 03/27/2019    PVI Mar 27, 2019 (cryo-PVI + RA-CTI line)     Past Surgical History:   Procedure Laterality Date    EP ABLATION AFLUTTER  03/27/2019    Procedure: EP Ablation Atrial Flutter;  Surgeon: Mayur Lu MD;  Location: [facility] Cath Lab;  Service: Cardiology    EP ABLATION PULMONARY VEIN ISOLATION      EP ABLATION PVI Left 03/27/2019    Procedure: EP Ablation PVI;  Surgeon: Mayur Lu MD;  Location: [facility] Lab;  Service: Cardiology    EXCISE GANGLION WRIST Right     ring finger    HERNIA REPAIR      HERNIA REPAIR, UMBILICAL      LAPAROSCOPIC CHOLECYSTECTOMY N/A 6/15/2023    Procedure: CHOLECYSTECTOMY, ROBOT-ASSISTED, LAPAROSCOPIC, USING DA LETY XI, LYSIS OF ADHESIONS;  Surgeon: Vernon Bhat DO;  Location: [facility] OR    [place] CARDIOVERSION ELECTIVE ARRHYTHMIA EXTERNAL      10/01/2009, 3/3/18    PROSTATECTOMY  12/01/2013    TONSILLECTOMY       Family History   Problem Relation Age of Onset     No Known Problems Mother     Cancer Father     Dyslipidemia Father     Aneurysm Father         ruputre aortic aneruysm    No Known Problems Sister     No Known Problems Brother         Social History     Socioeconomic History    Marital status:      Spouse name: Not on file    Number of children: Not on file    Years of education: Not on file    Highest education level: Not on file   Occupational History    Not on file   Tobacco Use    Smoking status: Never    Smokeless tobacco: Never   Vaping Use    Vaping Use: Never used   Substance and Sexual Activity    Alcohol use: Yes     Alcohol/week: 2.0 standard drinks of alcohol     Types: 2 Standard drinks or equivalent per week     Comment: 1 day a week    Drug use: No    Sexual activity: Not on file   Other Topics Concern    Not on file   Social History Narrative    Not on file     Social Determinants of Health     Financial Resource Strain: Not on file   Food Insecurity: Not on file   Transportation Needs: Not on file   Physical Activity: Not on file   Stress: Not on file   Social Connections: Not on file   Interpersonal Safety: Not on file   Housing Stability: Not on file           Medications  Allergies   Current Outpatient Medications   Medication Sig Dispense Refill    amLODIPine (NORVASC) 10 MG tablet [AMLODIPINE (NORVASC) 10 MG TABLET] Take 10 mg by mouth daily.      atenolol (TENORMIN) 50 MG tablet [ATENOLOL (TENORMIN) 50 MG TABLET] Take 50 mg by mouth 2 (two) times a day.      atorvastatin (LIPITOR) 80 MG tablet Take 1 tablet by mouth At Bedtime      b complex vitamins capsule [B COMPLEX VITAMINS CAPSULE] 1 capsule daily.      cholecalciferol, vitamin D3, (VITAMIN D3) 1,000 unit capsule Take 1,000 Units by mouth daily      cimetidine (TAGAMET) 800 MG tablet [CIMETIDINE (TAGAMET) 800 MG TABLET] 400 mg daily. Take as directed.      clotrimazole-betamethasone (LOTRISONE) 1-0.05 % external cream APPLY A THIN LAYER TOPICALLY TWICE DAILY      DOCOSAHEXANOIC  "ACID/EPA (FISH OIL ORAL) Take 1,200 mg by mouth 2 times daily      gemfibrozil (LOPID) 600 MG tablet Take 600 mg by mouth 2 times daily      lisinopril-hydrochlorothiazide (ZESTORETIC) 20-25 MG tablet TAKE 1 TABLET BY MOUTH EVERY DAY 90 tablet 3    multivitamin (MULTIVITAMIN) per tablet [MULTIVITAMIN (MULTIVITAMIN) PER TABLET] 1 tablet daily.      niacin 500 MG tablet [NIACIN 500 MG TABLET] 500 mg daily. Take as directed.      NONFORMULARY CPAP machine for home use at pressure 9, nasal mask  x1/3month with nasal pillows x 2/mo, lifetime length of need, daily use      omeprazole (PRILOSEC) 20 MG capsule [OMEPRAZOLE (PRILOSEC) 20 MG CAPSULE] 20 mg daily.      XARELTO ANTICOAGULANT 20 MG TABS tablet TAKE 1 TABLET (20 MG) BY MOUTH DAILY (WITH DINNER) -NEEDS TO SCHEDULE CARDIOLOGY FOLLOW-UP APPT FOR FURTHER REFILLS 90 tablet 0     No Known Allergies       Lab Results    Chemistry/lipid CBC Cardiac Enzymes/BNP/TSH/INR   Recent Labs   Lab Test 01/30/24  0734   CHOL 142   HDL 30*   LDL 88   TRIG 119     Recent Labs   Lab Test 01/30/24  0734 01/25/23  0827 01/26/22  0706   LDL 88 87 96     Recent Labs   Lab Test 01/30/24  0734      POTASSIUM 4.0   CHLORIDE 104   CO2 27   *   BUN 15.5   CR 1.24*   GFRESTIMATED 61   ISAAC 9.7     Recent Labs   Lab Test 01/30/24  0734 05/16/23  1459 01/25/23  0827   CR 1.24* 1.05 1.26*     No results for input(s): \"A1C\" in the last 54165 hours.       Recent Labs   Lab Test 12/07/20  1056   WBC 8.4   HGB 15.4   HCT 43.8   MCV 90        Recent Labs   Lab Test 12/07/20  1056 03/27/19  0645 03/18/19  0649   HGB 15.4 14.5 15.5    Recent Labs   Lab Test 09/17/18  1713 03/03/18  1318   TROPONINI <0.01 0.01     No results for input(s): \"BNP\", \"NTBNPI\", \"NTBNP\" in the last 88064 hours.  Recent Labs   Lab Test 02/14/19  1624   TSH 1.47     Recent Labs   Lab Test 03/03/18  1318   INR 1.03          This note has been dictated using voice recognition software. Any grammatical, " typographical, or context distortions are unintentional and inherent to the software    Jenn Hall PA-C

## 2024-03-28 ENCOUNTER — OFFICE VISIT (OUTPATIENT)
Dept: CARDIOLOGY | Facility: CLINIC | Age: 74
End: 2024-03-28
Payer: COMMERCIAL

## 2024-03-28 VITALS
HEART RATE: 62 BPM | SYSTOLIC BLOOD PRESSURE: 122 MMHG | RESPIRATION RATE: 18 BRPM | OXYGEN SATURATION: 96 % | BODY MASS INDEX: 32.84 KG/M2 | WEIGHT: 216 LBS | DIASTOLIC BLOOD PRESSURE: 70 MMHG

## 2024-03-28 DIAGNOSIS — E78.5 HYPERLIPIDEMIA LDL GOAL <100: ICD-10-CM

## 2024-03-28 DIAGNOSIS — I10 ESSENTIAL HYPERTENSION: ICD-10-CM

## 2024-03-28 DIAGNOSIS — I48.0 PAROXYSMAL ATRIAL FIBRILLATION (H): Primary | ICD-10-CM

## 2024-03-28 PROCEDURE — 99214 OFFICE O/P EST MOD 30 MIN: CPT

## 2024-03-28 NOTE — LETTER
"3/28/2024    Gael Staley MD  [address] [place] [address]    RE: Conrad Carson       Dear Colleague,     I had the pleasure of seeing Conrad Carson in the [facility].    HEART CARE ENCOUNTER CONSULTATON NOTE      [facility]  [phone]      Assessment/Recommendations   Assessment:   Paroxysmal atrial fibrillation: s/p PVI 3/2019 Xarelto, seems to have had likely recurrence of A-fib lasting about 12 to 16 hours 1 month ago sensation of \"knot in stomach\"and palpation of irregular pulse.  No sensation of fast heart rates although did not count pulse.  Continuous onset at rest and turn to NSR.  No recurrence since.  Hypertension: Controlled on Amlodipine 10 mg, atenolol 50 mg, lisinopril-HCTZ 20-25 mg   Hyperlipidemia: LDL 88 on Controlled on atorvastatin 80 mg  MARCELA: CPAP nightly    Plan:   Continue Xarelto, filled  Discussed monitoring for other recurrence, notify clinic.  Instructed to monitor heart rates in A-fib with goal less than 100 bpm.  Exercise and healthy diet  Continue CPAP nightly      Follow up 9/2023 for annual with Dr. Tamez or sooner as needed     History of Present Illness/Subjective    HPI: Conrad Carson is a 73 year old male with PMHx of paroxysmal A-fib, HTN, HLD, MARCELA presents for follow-up for medication refills.    Seems to have had likely recurrence of A-fib lasting about 12 to 16 hours 1 month ago sensation of \"knot in stomach\" and palpation of irregular pulse.  No sensation of fast heart rates although did not count pulse.  Continuous onset at rest and turn to NSR.  No recurrence since.  Recalls maybe 1 other recurrence since ablation 2019 maybe 2020 or 2021.  1 week prior experienced a slip and fall in his driveway and hit his head.  No residual side effects or injury while on Xarelto.  Is using CPAP nightly       Physical Examination  Review of Systems   Vitals: /70 (BP Location: Right arm, Patient Position: Sitting, " Cuff Size: Adult Large)   Pulse 62   Resp 18   Wt 98 kg (216 lb)   SpO2 96%   BMI 32.84 kg/m    BMI= Body mass index is 32.84 kg/m .  Wt Readings from Last 3 Encounters:   03/28/24 98 kg (216 lb)   09/08/23 98.7 kg (217 lb 11.2 oz)   06/15/23 97.7 kg (215 lb 4.8 oz)           ENT/Mouth: membranes moist, no oral lesions or bleeding gums.      EYES:  no scleral icterus, normal conjunctivae       Chest/Lungs:   lungs are clear to auscultation, no rales or wheezing,  equal chest wall expansion    Cardiovascular:   Regular. Normal first and second heart sounds with no murmurs, rubs, or gallops; the radial pulses are intact, trace edema bilaterally        Extremities: no cyanosis or clubbing   Skin: no xanthelasma, warm.    Neurologic:  no tremors     Psychiatric: alert and oriented x3, calm        Please refer above for cardiac ROS details.        Medical History  Surgical History Family History Social History   Past Medical History:   Diagnosis Date    Antiplatelet or antithrombotic long-term use     Atrial fibrillation (H)     HLD (hyperlipidemia)     HTN (hypertension)     Irregular heart beat     Prostate cancer (H)     Sleep apnea     Status post catheter ablation of atrial fibrillation 03/27/2019    PVI Mar 27, 2019 (cryo-PVI + RA-CTI line)     Past Surgical History:   Procedure Laterality Date    EP ABLATION AFLUTTER  03/27/2019    Procedure: EP Ablation Atrial Flutter;  Surgeon: Mayur Lu MD;  Location: [facility];  Service: Cardiology    EP ABLATION PULMONARY VEIN ISOLATION      EP ABLATION PVI Left 03/27/2019    Procedure: EP Ablation PVI;  Surgeon: Mayur Lu MD;  Location: Health system Cath Lab;  Service: Cardiology    EXCISE GANGLION WRIST Right     ring finger    HERNIA REPAIR      HERNIA REPAIR, UMBILICAL      LAPAROSCOPIC CHOLECYSTECTOMY N/A 6/15/2023    Procedure: CHOLECYSTECTOMY, ROBOT-ASSISTED, LAPAROSCOPIC, USING DA LETY XI, LYSIS OF ADHESIONS;  Surgeon: Vernon Bhat DO;   Location: [facility] Main OR    OK CARDIOVERSION ELECTIVE ARRHYTHMIA EXTERNAL      10/01/2009, 3/3/18    PROSTATECTOMY  12/01/2013    TONSILLECTOMY       Family History   Problem Relation Age of Onset    No Known Problems Mother     Cancer Father     Dyslipidemia Father     Aneurysm Father         ruputre aortic aneruysm    No Known Problems Sister     No Known Problems Brother         Social History     Socioeconomic History    Marital status:      Spouse name: Not on file    Number of children: Not on file    Years of education: Not on file    Highest education level: Not on file   Occupational History    Not on file   Tobacco Use    Smoking status: Never    Smokeless tobacco: Never   Vaping Use    Vaping Use: Never used   Substance and Sexual Activity    Alcohol use: Yes     Alcohol/week: 2.0 standard drinks of alcohol     Types: 2 Standard drinks or equivalent per week     Comment: 1 day a week    Drug use: No    Sexual activity: Not on file   Other Topics Concern    Not on file   Social History Narrative    Not on file     Social Determinants of Health     Financial Resource Strain: Not on file   Food Insecurity: Not on file   Transportation Needs: Not on file   Physical Activity: Not on file   Stress: Not on file   Social Connections: Not on file   Interpersonal Safety: Not on file   Housing Stability: Not on file           Medications  Allergies   Current Outpatient Medications   Medication Sig Dispense Refill    amLODIPine (NORVASC) 10 MG tablet [AMLODIPINE (NORVASC) 10 MG TABLET] Take 10 mg by mouth daily.      atenolol (TENORMIN) 50 MG tablet [ATENOLOL (TENORMIN) 50 MG TABLET] Take 50 mg by mouth 2 (two) times a day.      atorvastatin (LIPITOR) 80 MG tablet Take 1 tablet by mouth At Bedtime      b complex vitamins capsule [B COMPLEX VITAMINS CAPSULE] 1 capsule daily.      cholecalciferol, vitamin D3, (VITAMIN D3) 1,000 unit capsule Take 1,000 Units by mouth daily      cimetidine (TAGAMET) 800 MG  "tablet [CIMETIDINE (TAGAMET) 800 MG TABLET] 400 mg daily. Take as directed.      clotrimazole-betamethasone (LOTRISONE) 1-0.05 % external cream APPLY A THIN LAYER TOPICALLY TWICE DAILY      DOCOSAHEXANOIC ACID/EPA (FISH OIL ORAL) Take 1,200 mg by mouth 2 times daily      gemfibrozil (LOPID) 600 MG tablet Take 600 mg by mouth 2 times daily      lisinopril-hydrochlorothiazide (ZESTORETIC) 20-25 MG tablet TAKE 1 TABLET BY MOUTH EVERY DAY 90 tablet 3    multivitamin (MULTIVITAMIN) per tablet [MULTIVITAMIN (MULTIVITAMIN) PER TABLET] 1 tablet daily.      niacin 500 MG tablet [NIACIN 500 MG TABLET] 500 mg daily. Take as directed.      NONFORMULARY CPAP machine for home use at pressure 9, nasal mask  x1/3month with nasal pillows x 2/mo, lifetime length of need, daily use      omeprazole (PRILOSEC) 20 MG capsule [OMEPRAZOLE (PRILOSEC) 20 MG CAPSULE] 20 mg daily.      XARELTO ANTICOAGULANT 20 MG TABS tablet TAKE 1 TABLET (20 MG) BY MOUTH DAILY (WITH DINNER) -NEEDS TO SCHEDULE CARDIOLOGY FOLLOW-UP APPT FOR FURTHER REFILLS 90 tablet 0     No Known Allergies       Lab Results    Chemistry/lipid CBC Cardiac Enzymes/BNP/TSH/INR   Recent Labs   Lab Test 01/30/24  0734   CHOL 142   HDL 30*   LDL 88   TRIG 119     Recent Labs   Lab Test 01/30/24  0734 01/25/23  0827 01/26/22  0706   LDL 88 87 96     Recent Labs   Lab Test 01/30/24  0734      POTASSIUM 4.0   CHLORIDE 104   CO2 27   *   BUN 15.5   CR 1.24*   GFRESTIMATED 61   ISAAC 9.7     Recent Labs   Lab Test 01/30/24  0734 05/16/23  1459 01/25/23  0827   CR 1.24* 1.05 1.26*     No results for input(s): \"A1C\" in the last 01588 hours.       Recent Labs   Lab Test 12/07/20  1056   WBC 8.4   HGB 15.4   HCT 43.8   MCV 90        Recent Labs   Lab Test 12/07/20  1056 03/27/19  0645 03/18/19  0649   HGB 15.4 14.5 15.5    Recent Labs   Lab Test 09/17/18  1713 03/03/18  1318   TROPONINI <0.01 0.01     No results for input(s): \"BNP\", \"NTBNPI\", \"NTBNP\" in the last 72000 " hours.  Recent Labs   Lab Test 02/14/19  1624   TSH 1.47     Recent Labs   Lab Test 03/03/18  1318   INR 1.03          This note has been dictated using voice recognition software. Any grammatical, typographical, or context distortions are unintentional and inherent to the software    Jenn Hall PA-C      Thank you for allowing me to participate in the care of your patient.      Sincerely,     Jenn Coburn PA-C     [facility]  cc:   Dave Tamez MD  [address] [facility] [address]  [place] [address]

## 2024-03-28 NOTE — PATIENT INSTRUCTIONS
It was a pleasure taking part in your care today:    - Continue Xarelto   - if heart rates controlled <100-110 bpm can contact clinic, otherwise seek urgent care  - Continue current medications, CPAP    Please call the [facility] clinic with any questions or concerns at [phone].     Jenn Hall PA-C

## 2024-04-13 ENCOUNTER — TRANSFERRED RECORDS (OUTPATIENT)
Dept: HEALTH INFORMATION MANAGEMENT | Facility: CLINIC | Age: 74
End: 2024-04-13

## 2024-06-03 DIAGNOSIS — I48.0 PAROXYSMAL ATRIAL FIBRILLATION (H): ICD-10-CM

## 2024-06-04 RX ORDER — RIVAROXABAN 20 MG/1
TABLET, FILM COATED ORAL
Qty: 90 TABLET | Refills: 1 | Status: SHIPPED | OUTPATIENT
Start: 2024-06-04

## 2024-07-24 ENCOUNTER — TRANSFERRED RECORDS (OUTPATIENT)
Dept: HEALTH INFORMATION MANAGEMENT | Facility: CLINIC | Age: 74
End: 2024-07-24

## 2024-07-24 LAB
CHOLESTEROL (EXTERNAL): 142 MG/DL
HBA1C MFR BLD: 5.8 % (ref 4.2–6.1)
HDLC SERPL-MCNC: 30 MG/DL
LDL CHOLESTEROL CALCULATED (EXTERNAL): 88 MG/DL
TRIGLYCERIDES (EXTERNAL): 119 MG/DL

## 2024-09-19 ENCOUNTER — OFFICE VISIT (OUTPATIENT)
Dept: CARDIOLOGY | Facility: CLINIC | Age: 74
End: 2024-09-19
Payer: COMMERCIAL

## 2024-09-19 VITALS
SYSTOLIC BLOOD PRESSURE: 112 MMHG | DIASTOLIC BLOOD PRESSURE: 62 MMHG | OXYGEN SATURATION: 94 % | BODY MASS INDEX: 33.03 KG/M2 | RESPIRATION RATE: 16 BRPM | WEIGHT: 217.2 LBS | HEART RATE: 62 BPM

## 2024-09-19 DIAGNOSIS — E78.5 HYPERLIPIDEMIA LDL GOAL <100: ICD-10-CM

## 2024-09-19 DIAGNOSIS — I48.0 PAROXYSMAL ATRIAL FIBRILLATION (H): ICD-10-CM

## 2024-09-19 DIAGNOSIS — I10 ESSENTIAL HYPERTENSION: Primary | ICD-10-CM

## 2024-09-19 DIAGNOSIS — Z98.890 STATUS POST CATHETER ABLATION OF ATRIAL FIBRILLATION: ICD-10-CM

## 2024-09-19 DIAGNOSIS — G47.33 OBSTRUCTIVE SLEEP APNEA SYNDROME: ICD-10-CM

## 2024-09-19 PROCEDURE — 99214 OFFICE O/P EST MOD 30 MIN: CPT | Performed by: INTERNAL MEDICINE

## 2024-09-19 PROCEDURE — G2211 COMPLEX E/M VISIT ADD ON: HCPCS | Performed by: INTERNAL MEDICINE

## 2024-09-19 RX ORDER — BETAMETHASONE DIPROPIONATE 0.5 MG/G
1 CREAM TOPICAL DAILY
COMMUNITY

## 2024-09-19 RX ORDER — CLOTRIMAZOLE 1 %
1 CREAM (GRAM) TOPICAL 2 TIMES DAILY
COMMUNITY

## 2024-09-19 NOTE — LETTER
9/19/2024    Gael Staley MD  [address] [place] [address]    RE: Conrad Carson       Dear Colleague,     I had the pleasure of seeing Conrad Carson in the [facility].      [facility]  [phone]          Assessment/Recommendations   Patient with known hypertension, atrial fibrillation, status post pulmonary vein isolation procedure.  He has not had any recurrent atrial fibrillation in the last 6 months that he is aware of.  He is anticoagulated for elevated CHADS2 vascular score.    He does not exercise on a regular basis and I strongly encouraged him to go for a walk for a minimum of 30 minutes and a minimum of 5 times each week.  He will take that under advisement.    LDL cholesterol is well below 100, blood pressure is at goal.  He is not having any symptoms of chest discomfort or shortness of breath but if he had any new symptoms, I would have a very low threshold for imaging his coronary arteries or stress testing.    We will plan on seeing him back in 1 year, but of course would be happy to see him sooner if questions or problems arise.    The longitudinal plan of care for the diagnosis(es)/condition(s) as documented were addressed during this visit. Due to the added complexity in care, I will continue to support Kev in the subsequent management and with ongoing continuity of care.      History of Present Illness/Subjective    Mr. Conrad Carson is a 74 year old male with known atrial fibrillation, and hypertension.  He has not had any new symptoms.  He does not chest pain, unusual shortness of breath, orthopnea, paroxysmal nocturnal dyspnea although uses a CPAP mask.  He has not had any peripheral edema, palpitations, syncope or near syncopal episodes.  He has not sensed any atrial fibrillation.  He has not had any difficulties with bleeding problems and takes Xarelto for elevated CHADS2 vascular score.       Physical Examination Review of  Systems   /62 (BP Location: Right arm, Patient Position: Sitting, Cuff Size: Adult Large)   Pulse 62   Resp 16   Wt 98.5 kg (217 lb 3.2 oz)   SpO2 94%   BMI 33.03 kg/m    Body mass index is 33.03 kg/m .  Wt Readings from Last 3 Encounters:   09/19/24 98.5 kg (217 lb 3.2 oz)   03/28/24 98 kg (216 lb)   09/08/23 98.7 kg (217 lb 11.2 oz)     General Appearance:   Alert, cooperative and in no acute distress.   ENT/Mouth: Pink/moist oral mucosa   EYES:  no scleral icterus, normal conjunctivae   Neck: JVP normal. No Hepatojugular reflux. Thyroid not visualized.   Chest/Lungs:   Lungs are clear to auscultation, equal chest wall expansion.   Cardiovascular:   S1, S2 without murmur ,clicks or rubs. Brachial, radial and posterior tibial pulses are intact and symetric. No carotid bruits noted   Abdomen:  Nontender.  Rounded   Extremities: No cyanosis, clubbing or edema   Skin: no xanthelasma, warm.    Neurologic: normal arm movement bilateral, no tremors     Psychiatric: Appropriate affect.      Enc Vitals  BP: 112/62  Pulse: 62  Resp: 16  SpO2: 94 %  Weight: 98.5 kg (217 lb 3.2 oz) (shoes on)                                           Medical History  Surgical History Family History Social History   Past Medical History:   Diagnosis Date     Antiplatelet or antithrombotic long-term use      Atrial fibrillation (H)      HLD (hyperlipidemia)      HTN (hypertension)      Irregular heart beat      Prostate cancer (H)      Sleep apnea      Status post catheter ablation of atrial fibrillation 03/27/2019    PVI Mar 27, 2019 (cryo-PVI + RA-CTI line)    Past Surgical History:   Procedure Laterality Date     EP ABLATION AFLUTTER  03/27/2019    Procedure: EP Ablation Atrial Flutter;  Surgeon: Mayur Lu MD;  Location: [facility] Cath Lab;  Service: Cardiology     EP ABLATION PULMONARY VEIN ISOLATION       EP ABLATION PVI Left 03/27/2019    Procedure: EP Ablation PVI;  Surgeon: Mayur Lu MD;  Location: [facility]  Cath Lab;  Service: Cardiology     EXCISE GANGLION WRIST Right     ring finger     HERNIA REPAIR       HERNIA REPAIR, UMBILICAL       LAPAROSCOPIC CHOLECYSTECTOMY N/A 6/15/2023    Procedure: CHOLECYSTECTOMY, ROBOT-ASSISTED, LAPAROSCOPIC, USING DA LETY XI, LYSIS OF ADHESIONS;  Surgeon: Vernon Bhat DO;  Location: [facility] OR     SD CARDIOVERSION ELECTIVE ARRHYTHMIA EXTERNAL      10/01/2009, 3/3/18     PROSTATECTOMY  12/01/2013     TONSILLECTOMY      Family History   Problem Relation Age of Onset     No Known Problems Mother      Cancer Father      Dyslipidemia Father      Aneurysm Father         ruputre aortic aneruysm     No Known Problems Sister      No Known Problems Brother     Social History     Socioeconomic History     Marital status:      Spouse name: Not on file     Number of children: Not on file     Years of education: Not on file     Highest education level: Not on file   Occupational History     Not on file   Tobacco Use     Smoking status: Never     Smokeless tobacco: Never   Vaping Use     Vaping status: Never Used   Substance and Sexual Activity     Alcohol use: Yes     Alcohol/week: 2.0 standard drinks of alcohol     Types: 2 Standard drinks or equivalent per week     Comment: 1 day a week     Drug use: No     Sexual activity: Not on file   Other Topics Concern     Not on file   Social History Narrative     Not on file     Social Determinants of Health     Financial Resource Strain: High Risk (1/1/2022)    [facility], [facility]    Financial Resource Strain      Difficulty of Paying Living Expenses: Not on file      Difficulty of Paying Living Expenses: Not on file   Food Insecurity: Not on file   Transportation Needs: Not on file   Physical Activity: Not on file   Stress: Not on file   Social Connections: Unknown (11/30/2022)    [facility], [employer] [facility] [employer] [facility]    Social Connections      Frequency of Communication with Friends and Family: Not on file   Interpersonal Safety: Not on file   Housing Stability: Not on file          Medications  Allergies   Current Outpatient Medications   Medication Sig Dispense Refill     amLODIPine (NORVASC) 10 MG tablet [AMLODIPINE (NORVASC) 10 MG TABLET] Take 10 mg by mouth daily.       atenolol (TENORMIN) 50 MG tablet [ATENOLOL (TENORMIN) 50 MG TABLET] Take 50 mg by mouth 2 (two) times a day.       atorvastatin (LIPITOR) 80 MG tablet Take 1 tablet by mouth At Bedtime       b complex vitamins capsule [B COMPLEX VITAMINS CAPSULE] 1 capsule daily.       betamethasone dipropionate (DIPROSONE) 0.05 % external cream Apply 1 Application topically daily.       cholecalciferol, vitamin D3, (VITAMIN D3) 1,000 unit capsule Take 1,000 Units by mouth daily       cimetidine (TAGAMET) 800 MG tablet [CIMETIDINE (TAGAMET) 800 MG TABLET] 400 mg daily. Take as directed.       clotrimazole (LOTRIMIN) 1 % external cream Apply 1 Application topically 2 times daily.       DOCOSAHEXANOIC ACID/EPA (FISH OIL ORAL) Take 1,200 mg by mouth 2 times daily       gemfibrozil (LOPID) 600 MG tablet Take 600 mg by mouth 2 times daily       lisinopril-hydrochlorothiazide (ZESTORETIC) 20-25 MG tablet TAKE 1 TABLET BY MOUTH EVERY DAY 90 tablet 3     multivitamin (MULTIVITAMIN) per tablet [MULTIVITAMIN (MULTIVITAMIN) PER TABLET] 1 tablet daily.       niacin 500 MG tablet [NIACIN 500 MG TABLET] 500 mg daily. Take as directed.       NONFORMULARY CPAP machine for home use at pressure 9, nasal mask  x1/3month with nasal pillows x 2/mo, lifetime length of need, daily use       omeprazole (PRILOSEC) 20 MG capsule [OMEPRAZOLE (PRILOSEC) 20 MG CAPSULE] 20 mg daily.       rivaroxaban ANTICOAGULANT (XARELTO ANTICOAGULANT) 20 MG TABS tablet TAKE 1 TABLET (20 MG) BY MOUTH DAILY (WITH DINNER) -NEEDS TO SCHEDULE CARDIOLOGY FOLLOW-UP APPT FOR FURTHER REFILLS 90  tablet 1    No Known Allergies      Lab Results    Chemistry/lipid CBC Cardiac Enzymes/BNP/TSH/INR   Lab Results   Component Value Date    CHOL 142 01/30/2024    HDL 30 (L) 01/30/2024    TRIG 119 01/30/2024    TRIG 119 01/30/2024    BUN 15.5 01/30/2024     01/30/2024    CO2 27 01/30/2024    Lab Results   Component Value Date    WBC 8.4 12/07/2020    HGB 15.4 12/07/2020    HCT 43.8 12/07/2020    MCV 90 12/07/2020     12/07/2020    Lab Results   Component Value Date    TROPONINI <0.01 09/17/2018    TSH 1.47 02/14/2019    INR 1.03 03/03/2018                                                Thank you for allowing me to participate in the care of your patient.      Sincerely,     Dave Tamez MD     [facility]  cc:   Dave Tamez MD  [address] [facility] [address]  [place] [address]

## 2024-09-19 NOTE — PROGRESS NOTES
[facility]  [phone]          Assessment/Recommendations   Patient with known hypertension, atrial fibrillation, status post pulmonary vein isolation procedure.  He has not had any recurrent atrial fibrillation in the last 6 months that he is aware of.  He is anticoagulated for elevated CHADS2 vascular score.    He does not exercise on a regular basis and I strongly encouraged him to go for a walk for a minimum of 30 minutes and a minimum of 5 times each week.  He will take that under advisement.    LDL cholesterol is well below 100, blood pressure is at goal.  He is not having any symptoms of chest discomfort or shortness of breath but if he had any new symptoms, I would have a very low threshold for imaging his coronary arteries or stress testing.    We will plan on seeing him back in 1 year, but of course would be happy to see him sooner if questions or problems arise.    The longitudinal plan of care for the diagnosis(es)/condition(s) as documented were addressed during this visit. Due to the added complexity in care, I will continue to support Kev in the subsequent management and with ongoing continuity of care.      History of Present Illness/Subjective    Mr. Conrad Carson is a 74 year old male with known atrial fibrillation, and hypertension.  He has not had any new symptoms.  He does not chest pain, unusual shortness of breath, orthopnea, paroxysmal nocturnal dyspnea although uses a CPAP mask.  He has not had any peripheral edema, palpitations, syncope or near syncopal episodes.  He has not sensed any atrial fibrillation.  He has not had any difficulties with bleeding problems and takes Xarelto for elevated CHADS2 vascular score.       Physical Examination Review of Systems   /62 (BP Location: Right arm, Patient Position: Sitting, Cuff Size: Adult Large)   Pulse 62   Resp 16   Wt 98.5 kg (217 lb 3.2 oz)   SpO2 94%   BMI 33.03 kg/m    Body mass index is 33.03  kg/m .  Wt Readings from Last 3 Encounters:   09/19/24 98.5 kg (217 lb 3.2 oz)   03/28/24 98 kg (216 lb)   09/08/23 98.7 kg (217 lb 11.2 oz)     General Appearance:   Alert, cooperative and in no acute distress.   ENT/Mouth: Pink/moist oral mucosa   EYES:  no scleral icterus, normal conjunctivae   Neck: JVP normal. No Hepatojugular reflux. Thyroid not visualized.   Chest/Lungs:   Lungs are clear to auscultation, equal chest wall expansion.   Cardiovascular:   S1, S2 without murmur ,clicks or rubs. Brachial, radial and posterior tibial pulses are intact and symetric. No carotid bruits noted   Abdomen:  Nontender.  Rounded   Extremities: No cyanosis, clubbing or edema   Skin: no xanthelasma, warm.    Neurologic: normal arm movement bilateral, no tremors     Psychiatric: Appropriate affect.      Enc Vitals  BP: 112/62  Pulse: 62  Resp: 16  SpO2: 94 %  Weight: 98.5 kg (217 lb 3.2 oz) (shoes on)                                           Medical History  Surgical History Family History Social History   Past Medical History:   Diagnosis Date    Antiplatelet or antithrombotic long-term use     Atrial fibrillation (H)     HLD (hyperlipidemia)     HTN (hypertension)     Irregular heart beat     Prostate cancer (H)     Sleep apnea     Status post catheter ablation of atrial fibrillation 03/27/2019    PVI Mar 27, 2019 (cryo-PVI + RA-CTI line)    Past Surgical History:   Procedure Laterality Date    EP ABLATION AFLUTTER  03/27/2019    Procedure: EP Ablation Atrial Flutter;  Surgeon: Mayur Lu MD;  Location: [facility];  Service: Cardiology    EP ABLATION PULMONARY VEIN ISOLATION      EP ABLATION PVI Left 03/27/2019    Procedure: EP Ablation PVI;  Surgeon: Mayur Lu MD;  Location: [facility];  Service: Cardiology    EXCISE GANGLION WRIST Right     ring finger    HERNIA REPAIR      HERNIA REPAIR, UMBILICAL      LAPAROSCOPIC CHOLECYSTECTOMY N/A 6/15/2023    Procedure: CHOLECYSTECTOMY,  ROBOT-ASSISTED, LAPAROSCOPIC, USING DA LETY XI, LYSIS OF ADHESIONS;  Surgeon: Vernon Bhat DO;  Location: [facility] OR    NE CARDIOVERSION ELECTIVE ARRHYTHMIA EXTERNAL      10/01/2009, 3/3/18    PROSTATECTOMY  12/01/2013    TONSILLECTOMY      Family History   Problem Relation Age of Onset    No Known Problems Mother     Cancer Father     Dyslipidemia Father     Aneurysm Father         ruputre aortic aneruysm    No Known Problems Sister     No Known Problems Brother     Social History     Socioeconomic History    Marital status:      Spouse name: Not on file    Number of children: Not on file    Years of education: Not on file    Highest education level: Not on file   Occupational History    Not on file   Tobacco Use    Smoking status: Never    Smokeless tobacco: Never   Vaping Use    Vaping status: Never Used   Substance and Sexual Activity    Alcohol use: Yes     Alcohol/week: 2.0 standard drinks of alcohol     Types: 2 Standard drinks or equivalent per week     Comment: 1 day a week    Drug use: No    Sexual activity: Not on file   Other Topics Concern    Not on file   Social History Narrative    Not on file     Social Determinants of Health     Financial Resource Strain: High Risk (1/1/2022)    [facility], [employer] [facility]    Financial Resource Strain     Difficulty of Paying Living Expenses: Not on file     Difficulty of Paying Living Expenses: Not on file   Food Insecurity: Not on file   Transportation Needs: Not on file   Physical Activity: Not on file   Stress: Not on file   Social Connections: Unknown (11/30/2022)    Received from [employer] [facility], [employer] [facility]    Social Connections     Frequency of Communication with Friends and Family: Not on file   Interpersonal Safety: Not on file   Housing Stability: Not on file          Medications  Allergies   Current  Outpatient Medications   Medication Sig Dispense Refill    amLODIPine (NORVASC) 10 MG tablet [AMLODIPINE (NORVASC) 10 MG TABLET] Take 10 mg by mouth daily.      atenolol (TENORMIN) 50 MG tablet [ATENOLOL (TENORMIN) 50 MG TABLET] Take 50 mg by mouth 2 (two) times a day.      atorvastatin (LIPITOR) 80 MG tablet Take 1 tablet by mouth At Bedtime      b complex vitamins capsule [B COMPLEX VITAMINS CAPSULE] 1 capsule daily.      betamethasone dipropionate (DIPROSONE) 0.05 % external cream Apply 1 Application topically daily.      cholecalciferol, vitamin D3, (VITAMIN D3) 1,000 unit capsule Take 1,000 Units by mouth daily      cimetidine (TAGAMET) 800 MG tablet [CIMETIDINE (TAGAMET) 800 MG TABLET] 400 mg daily. Take as directed.      clotrimazole (LOTRIMIN) 1 % external cream Apply 1 Application topically 2 times daily.      DOCOSAHEXANOIC ACID/EPA (FISH OIL ORAL) Take 1,200 mg by mouth 2 times daily      gemfibrozil (LOPID) 600 MG tablet Take 600 mg by mouth 2 times daily      lisinopril-hydrochlorothiazide (ZESTORETIC) 20-25 MG tablet TAKE 1 TABLET BY MOUTH EVERY DAY 90 tablet 3    multivitamin (MULTIVITAMIN) per tablet [MULTIVITAMIN (MULTIVITAMIN) PER TABLET] 1 tablet daily.      niacin 500 MG tablet [NIACIN 500 MG TABLET] 500 mg daily. Take as directed.      NONFORMULARY CPAP machine for home use at pressure 9, nasal mask  x1/3month with nasal pillows x 2/mo, lifetime length of need, daily use      omeprazole (PRILOSEC) 20 MG capsule [OMEPRAZOLE (PRILOSEC) 20 MG CAPSULE] 20 mg daily.      rivaroxaban ANTICOAGULANT (XARELTO ANTICOAGULANT) 20 MG TABS tablet TAKE 1 TABLET (20 MG) BY MOUTH DAILY (WITH DINNER) -NEEDS TO SCHEDULE CARDIOLOGY FOLLOW-UP APPT FOR FURTHER REFILLS 90 tablet 1    No Known Allergies      Lab Results    Chemistry/lipid CBC Cardiac Enzymes/BNP/TSH/INR   Lab Results   Component Value Date    CHOL 142 01/30/2024    HDL 30 (L) 01/30/2024    TRIG 119 01/30/2024    TRIG 119 01/30/2024    BUN 15.5  01/30/2024     01/30/2024    CO2 27 01/30/2024    Lab Results   Component Value Date    WBC 8.4 12/07/2020    HGB 15.4 12/07/2020    HCT 43.8 12/07/2020    MCV 90 12/07/2020     12/07/2020    Lab Results   Component Value Date    TROPONINI <0.01 09/17/2018    TSH 1.47 02/14/2019    INR 1.03 03/03/2018

## 2024-11-25 DIAGNOSIS — I10 ESSENTIAL HYPERTENSION: ICD-10-CM

## 2024-11-27 DIAGNOSIS — I48.0 PAROXYSMAL ATRIAL FIBRILLATION (H): ICD-10-CM

## 2024-11-27 RX ORDER — LISINOPRIL AND HYDROCHLOROTHIAZIDE 20; 25 MG/1; MG/1
TABLET ORAL
Qty: 90 TABLET | Refills: 2 | Status: SHIPPED | OUTPATIENT
Start: 2024-11-27

## 2024-12-06 ENCOUNTER — TRANSFERRED RECORDS (OUTPATIENT)
Dept: HEALTH INFORMATION MANAGEMENT | Facility: CLINIC | Age: 74
End: 2024-12-06

## 2025-01-22 ENCOUNTER — LAB REQUISITION (OUTPATIENT)
Dept: LAB | Facility: CLINIC | Age: 75
End: 2025-01-22

## 2025-01-22 DIAGNOSIS — Z85.46 PERSONAL HISTORY OF MALIGNANT NEOPLASM OF PROSTATE: ICD-10-CM

## 2025-01-22 DIAGNOSIS — I10 ESSENTIAL (PRIMARY) HYPERTENSION: ICD-10-CM

## 2025-01-22 PROCEDURE — 84153 ASSAY OF PSA TOTAL: CPT | Performed by: FAMILY MEDICINE

## 2025-01-22 PROCEDURE — 82247 BILIRUBIN TOTAL: CPT | Performed by: FAMILY MEDICINE

## 2025-01-22 PROCEDURE — 82040 ASSAY OF SERUM ALBUMIN: CPT | Performed by: FAMILY MEDICINE

## 2025-01-22 PROCEDURE — 83718 ASSAY OF LIPOPROTEIN: CPT | Performed by: FAMILY MEDICINE

## 2025-01-23 LAB
ALBUMIN SERPL BCG-MCNC: 4.1 G/DL (ref 3.5–5.2)
ALP SERPL-CCNC: 79 U/L (ref 40–150)
ALT SERPL W P-5'-P-CCNC: 23 U/L (ref 0–70)
ANION GAP SERPL CALCULATED.3IONS-SCNC: 10 MMOL/L (ref 7–15)
AST SERPL W P-5'-P-CCNC: 22 U/L (ref 0–45)
BILIRUB SERPL-MCNC: 0.6 MG/DL
BUN SERPL-MCNC: 15.3 MG/DL (ref 8–23)
CALCIUM SERPL-MCNC: 9.3 MG/DL (ref 8.8–10.4)
CHLORIDE SERPL-SCNC: 105 MMOL/L (ref 98–107)
CHOLEST SERPL-MCNC: 137 MG/DL
CREAT SERPL-MCNC: 1.09 MG/DL (ref 0.67–1.17)
EGFRCR SERPLBLD CKD-EPI 2021: 71 ML/MIN/1.73M2
FASTING STATUS PATIENT QL REPORTED: YES
FASTING STATUS PATIENT QL REPORTED: YES
GLUCOSE SERPL-MCNC: 137 MG/DL (ref 70–99)
HCO3 SERPL-SCNC: 27 MMOL/L (ref 22–29)
HDLC SERPL-MCNC: 29 MG/DL
LDLC SERPL CALC-MCNC: 79 MG/DL
NONHDLC SERPL-MCNC: 108 MG/DL
POTASSIUM SERPL-SCNC: 3.7 MMOL/L (ref 3.4–5.3)
PROT SERPL-MCNC: 7.3 G/DL (ref 6.4–8.3)
PSA SERPL DL<=0.01 NG/ML-MCNC: 0.05 NG/ML (ref 0–6.5)
SODIUM SERPL-SCNC: 142 MMOL/L (ref 135–145)
TRIGL SERPL-MCNC: 145 MG/DL

## 2025-07-23 ENCOUNTER — LAB REQUISITION (OUTPATIENT)
Dept: LAB | Facility: CLINIC | Age: 75
End: 2025-07-23

## 2025-07-23 ENCOUNTER — TRANSFERRED RECORDS (OUTPATIENT)
Dept: HEALTH INFORMATION MANAGEMENT | Facility: CLINIC | Age: 75
End: 2025-07-23
Payer: COMMERCIAL

## 2025-07-23 DIAGNOSIS — R61 GENERALIZED HYPERHIDROSIS: ICD-10-CM

## 2025-07-23 DIAGNOSIS — Z85.46 PERSONAL HISTORY OF MALIGNANT NEOPLASM OF PROSTATE: ICD-10-CM

## 2025-07-23 LAB
PSA SERPL DL<=0.01 NG/ML-MCNC: 0.05 NG/ML (ref 0–6.5)
TSH SERPL DL<=0.005 MIU/L-ACNC: 2.37 UIU/ML (ref 0.3–4.2)

## 2025-07-23 PROCEDURE — 84443 ASSAY THYROID STIM HORMONE: CPT | Performed by: FAMILY MEDICINE

## 2025-07-23 PROCEDURE — 84153 ASSAY OF PSA TOTAL: CPT | Performed by: FAMILY MEDICINE

## 2025-08-13 ENCOUNTER — OFFICE VISIT (OUTPATIENT)
Dept: CARDIOLOGY | Facility: CLINIC | Age: 75
End: 2025-08-13
Payer: COMMERCIAL

## 2025-08-13 VITALS
RESPIRATION RATE: 16 BRPM | SYSTOLIC BLOOD PRESSURE: 112 MMHG | HEIGHT: 68 IN | HEART RATE: 70 BPM | BODY MASS INDEX: 32.64 KG/M2 | WEIGHT: 215.4 LBS | DIASTOLIC BLOOD PRESSURE: 66 MMHG

## 2025-08-13 DIAGNOSIS — I10 ESSENTIAL HYPERTENSION: ICD-10-CM

## 2025-08-13 DIAGNOSIS — G47.33 OBSTRUCTIVE SLEEP APNEA SYNDROME: ICD-10-CM

## 2025-08-13 DIAGNOSIS — Z98.890 STATUS POST CATHETER ABLATION OF ATRIAL FIBRILLATION: ICD-10-CM

## 2025-08-13 DIAGNOSIS — I48.0 PAROXYSMAL ATRIAL FIBRILLATION (H): ICD-10-CM

## 2025-08-13 DIAGNOSIS — E78.5 HYPERLIPIDEMIA LDL GOAL <100: ICD-10-CM

## 2025-08-13 PROCEDURE — 3074F SYST BP LT 130 MM HG: CPT

## 2025-08-13 PROCEDURE — 99214 OFFICE O/P EST MOD 30 MIN: CPT

## 2025-08-13 PROCEDURE — 3078F DIAST BP <80 MM HG: CPT

## 2025-08-25 DIAGNOSIS — I10 ESSENTIAL HYPERTENSION: ICD-10-CM

## 2025-08-25 RX ORDER — LISINOPRIL AND HYDROCHLOROTHIAZIDE 20; 25 MG/1; MG/1
1 TABLET ORAL DAILY
Qty: 90 TABLET | Refills: 2 | Status: SHIPPED | OUTPATIENT
Start: 2025-08-25

## (undated) DEVICE — DECANTER VIAL 2006S

## (undated) DEVICE — DAVINCI XI OBTURATOR BLADELESS 8MM 470359

## (undated) DEVICE — SU VICRYL+ 3-0 27IN SH UND VCP416H

## (undated) DEVICE — SUTURE VICRYL+ 0 27IN CT-1 UND VCP260H

## (undated) DEVICE — SYR 50ML SLIP TIP W/O NDL 309654

## (undated) DEVICE — DAVINCI XI DRAPE ARM 470015

## (undated) DEVICE — GLOVE UNDER INDICATOR PI SZ 7.0 LF 41670

## (undated) DEVICE — SU DERMABOND ADVANCED .7ML DNX12

## (undated) DEVICE — DAVINCI XI SEAL UNIVERSAL 5-8MM 470361

## (undated) DEVICE — DRAPE SHEET TABLE COVER KC 42301*

## (undated) DEVICE — ENDO POUCH UNIVERSAL RETRIEVAL SYSTEM INZII 5MM CD003

## (undated) DEVICE — LUBRICANT INST ELECTROLUBE EL101

## (undated) DEVICE — SOL WATER IRRIG 1000ML BOTTLE 2F7114

## (undated) DEVICE — PREP CHLORAPREP 26ML TINTED HI-LITE ORANGE 930815

## (undated) DEVICE — CUSTOM PACK LAP CHOLE SBA5BLCHEA

## (undated) DEVICE — DRAPE SHEET REV FOLD 3/4 9349

## (undated) DEVICE — SUTURE VICRYL+ 4-0 UNDYED PS-2 VCP496H

## (undated) DEVICE — NDL INSUFFLATION 13GA 120MM C2201

## (undated) DEVICE — BLADE KNIFE SURG 11 371111

## (undated) DEVICE — DAVINCI XI DRAPE COLUMN 470341

## (undated) DEVICE — CLIP ENDO HEMO-LOC PURPLE LG 544240

## (undated) DEVICE — TUBING SMOKE EVAC PNEUMOCLEAR HIGH FLOW 0620050250

## (undated) DEVICE — DRAPE U SPLIT 74X120" 29440